# Patient Record
Sex: FEMALE | Race: BLACK OR AFRICAN AMERICAN | NOT HISPANIC OR LATINO | URBAN - METROPOLITAN AREA
[De-identification: names, ages, dates, MRNs, and addresses within clinical notes are randomized per-mention and may not be internally consistent; named-entity substitution may affect disease eponyms.]

---

## 2024-06-05 ENCOUNTER — OFFICE VISIT (OUTPATIENT)
Dept: FAMILY MEDICINE | Facility: CLINIC | Age: 24
End: 2024-06-05
Payer: MEDICAID

## 2024-06-05 VITALS
TEMPERATURE: 98 F | HEIGHT: 63 IN | OXYGEN SATURATION: 100 % | WEIGHT: 199.13 LBS | HEART RATE: 83 BPM | SYSTOLIC BLOOD PRESSURE: 112 MMHG | DIASTOLIC BLOOD PRESSURE: 76 MMHG | BODY MASS INDEX: 35.28 KG/M2

## 2024-06-05 DIAGNOSIS — Z23 NEED FOR VACCINATION: ICD-10-CM

## 2024-06-05 DIAGNOSIS — Z32.01 POSITIVE URINE PREGNANCY TEST: Primary | ICD-10-CM

## 2024-06-05 DIAGNOSIS — O09.33 LATE PRENATAL CARE COMPLICATING PREGNANCY, THIRD TRIMESTER: ICD-10-CM

## 2024-06-05 LAB
B-HCG UR QL: POSITIVE
BACTERIA #/AREA URNS AUTO: ABNORMAL /HPF
BASOPHILS # BLD AUTO: 0.01 X10(3)/MCL
BASOPHILS NFR BLD AUTO: 0.2 %
BILIRUB SERPL-MCNC: NEGATIVE MG/DL
BILIRUB UR QL STRIP.AUTO: NEGATIVE
BLOOD URINE, POC: NEGATIVE
C TRACH DNA SPEC QL NAA+PROBE: NOT DETECTED
CLARITY UR: CLEAR
CLARITY, POC UA: NORMAL
COLOR UR AUTO: COLORLESS
COLOR, POC UA: YELLOW
CTP QC/QA: YES
EOSINOPHIL # BLD AUTO: 0.04 X10(3)/MCL (ref 0–0.9)
EOSINOPHIL NFR BLD AUTO: 0.9 %
ERYTHROCYTE [DISTWIDTH] IN BLOOD BY AUTOMATED COUNT: 14.5 % (ref 11.5–17)
GLUCOSE 1H P 100 G GLC PO SERPL-MCNC: 164 MG/DL (ref 100–180)
GLUCOSE UR QL STRIP: NEGATIVE
GLUCOSE UR QL STRIP: NORMAL
GROUP & RH: NORMAL
HBV SURFACE AG SERPL QL IA: NONREACTIVE
HCT VFR BLD AUTO: 31.2 % (ref 37–47)
HCV AB SERPL QL IA: NONREACTIVE
HGB BLD-MCNC: 10.4 G/DL (ref 12–16)
HGB UR QL STRIP: NEGATIVE
HIV 1+2 AB+HIV1 P24 AG SERPL QL IA: NONREACTIVE
HYALINE CASTS #/AREA URNS LPF: ABNORMAL /LPF
IMM GRANULOCYTES # BLD AUTO: 0.02 X10(3)/MCL (ref 0–0.04)
IMM GRANULOCYTES NFR BLD AUTO: 0.4 %
INDIRECT COOMBS: NORMAL
KETONES UR QL STRIP: NEGATIVE
KETONES UR QL STRIP: NEGATIVE
LEUKOCYTE ESTERASE UR QL STRIP: NEGATIVE
LEUKOCYTE ESTERASE URINE, POC: NEGATIVE
LYMPHOCYTES # BLD AUTO: 1.02 X10(3)/MCL (ref 0.6–4.6)
LYMPHOCYTES NFR BLD AUTO: 21.9 %
MCH RBC QN AUTO: 28.6 PG (ref 27–31)
MCHC RBC AUTO-ENTMCNC: 33.3 G/DL (ref 33–36)
MCV RBC AUTO: 85.7 FL (ref 80–94)
MONOCYTES # BLD AUTO: 0.19 X10(3)/MCL (ref 0.1–1.3)
MONOCYTES NFR BLD AUTO: 4.1 %
MUCOUS THREADS URNS QL MICRO: ABNORMAL /LPF
N GONORRHOEA DNA SPEC QL NAA+PROBE: NOT DETECTED
NEUTROPHILS # BLD AUTO: 3.37 X10(3)/MCL (ref 2.1–9.2)
NEUTROPHILS NFR BLD AUTO: 72.5 %
NITRITE UR QL STRIP: NEGATIVE
NITRITE, POC UA: NEGATIVE
NRBC BLD AUTO-RTO: 0 %
PH UR STRIP: 7 [PH]
PH, POC UA: 7
PLATELET # BLD AUTO: 293 X10(3)/MCL (ref 130–400)
PMV BLD AUTO: 10.2 FL (ref 7.4–10.4)
PROT UR QL STRIP: NEGATIVE
PROTEIN, POC: NEGATIVE
RBC # BLD AUTO: 3.64 X10(6)/MCL (ref 4.2–5.4)
RBC #/AREA URNS AUTO: ABNORMAL /HPF
SOURCE (OHS): NORMAL
SP GR UR STRIP.AUTO: 1.01 (ref 1–1.03)
SPECIFIC GRAVITY, POC UA: 1.01
SPECIMEN OUTDATE: NORMAL
SQUAMOUS #/AREA URNS LPF: ABNORMAL /HPF
T PALLIDUM AB SER QL: NONREACTIVE
UROBILINOGEN UR STRIP-ACNC: NORMAL
UROBILINOGEN, POC UA: 0.2
WBC # SPEC AUTO: 4.65 X10(3)/MCL (ref 4.5–11.5)
WBC #/AREA URNS AUTO: ABNORMAL /HPF

## 2024-06-05 PROCEDURE — 86780 TREPONEMA PALLIDUM: CPT

## 2024-06-05 PROCEDURE — 81001 URINALYSIS AUTO W/SCOPE: CPT

## 2024-06-05 PROCEDURE — 81025 URINE PREGNANCY TEST: CPT | Mod: PBBFAC

## 2024-06-05 PROCEDURE — 88174 CYTOPATH C/V AUTO IN FLUID: CPT

## 2024-06-05 PROCEDURE — 36415 COLL VENOUS BLD VENIPUNCTURE: CPT

## 2024-06-05 PROCEDURE — 87491 CHLMYD TRACH DNA AMP PROBE: CPT

## 2024-06-05 PROCEDURE — 87086 URINE CULTURE/COLONY COUNT: CPT

## 2024-06-05 PROCEDURE — 90715 TDAP VACCINE 7 YRS/> IM: CPT | Mod: PBBFAC

## 2024-06-05 PROCEDURE — 87591 N.GONORRHOEAE DNA AMP PROB: CPT

## 2024-06-05 PROCEDURE — 86787 VARICELLA-ZOSTER ANTIBODY: CPT

## 2024-06-05 PROCEDURE — 87340 HEPATITIS B SURFACE AG IA: CPT

## 2024-06-05 PROCEDURE — 82950 GLUCOSE TEST: CPT

## 2024-06-05 PROCEDURE — 85660 RBC SICKLE CELL TEST: CPT

## 2024-06-05 PROCEDURE — 99203 OFFICE O/P NEW LOW 30 MIN: CPT | Mod: PBBFAC,25

## 2024-06-05 PROCEDURE — 87624 HPV HI-RISK TYP POOLED RSLT: CPT

## 2024-06-05 PROCEDURE — 86762 RUBELLA ANTIBODY: CPT

## 2024-06-05 PROCEDURE — 86850 RBC ANTIBODY SCREEN: CPT

## 2024-06-05 PROCEDURE — 87389 HIV-1 AG W/HIV-1&-2 AB AG IA: CPT

## 2024-06-05 PROCEDURE — 81002 URINALYSIS NONAUTO W/O SCOPE: CPT | Mod: 59,PBBFAC

## 2024-06-05 PROCEDURE — 85025 COMPLETE CBC W/AUTO DIFF WBC: CPT

## 2024-06-05 PROCEDURE — 90471 IMMUNIZATION ADMIN: CPT | Mod: PBBFAC

## 2024-06-05 PROCEDURE — 86803 HEPATITIS C AB TEST: CPT

## 2024-06-05 RX ADMIN — TETANUS TOXOID, REDUCED DIPHTHERIA TOXOID AND ACELLULAR PERTUSSIS VACCINE, ADSORBED 0.5 ML: 5; 2.5; 8; 8; 2.5 SUSPENSION INTRAMUSCULAR at 11:06

## 2024-06-05 NOTE — PROGRESS NOTES
"[facility] OB OFFICE VISIT NOTE  Bhavesh Carreon  [id number]  2024    Chief Complaint: UPT      Bhavesh Carreon is a 23 y.o. female   presenting to [facility] for positive UPT.     Current Issues: no unusual complaints    Chronic Issues: none    Gestational History:  G3 current  G2 term, RCS  G1 term, CS because nuchal cord      Gyn History:   - LMP: 23  - Age at menarche: 10 years  - Menstrual hx: regular, 28 day cycles, 3 pads/day, 5 days per period  - History of birth control: oral contraceptive only  - History of STDs and/or Abnormal PAPs: none, last Pap , states normal.   - History of prior : x2    Past Medical History: no  Surgical History: CSx2  Family History: denies  Social History: denies currently. Admits to tobacco smoking before current pregnancy  Medications: PNVs  PCP: no    Review of Systems   Constitutional:  Negative for chills and fever.   Respiratory:  Negative for shortness of breath.    Cardiovascular:  Negative for chest pain and palpitations.   Gastrointestinal:  Negative for diarrhea and vomiting.   Genitourinary:  Negative for dysuria.       Antepartum specific   - Fetal movements: yes  - Vaginal bleeding: no  - Vaginal discharge: no  - Loss of fluid: no  - Contractions: no  - Headaches: no  - Vision changes: no  - Edema: no    Blood pressure 112/76, pulse 83, temperature 97.9 °F (36.6 °C), temperature source Oral, height 5' 3" (1.6 m), weight 90.3 kg (199 lb 1.6 oz), last menstrual period 2023, SpO2 100%.   Physical Exam  General: well developed, gravid female, appears happy and is cooperative  Pysch: alert and oriented X 3  Resp: Lungs CTA bilaterally, no wheezing, no rhonchi, non labored respirations  CV: +2 symmetrical pulses upper extremity, mild edema of lower extremity, no murmurs, rubs or gallops  ABD: gravid, non TTP, +BS  FHT: 140s by Doppler  Fundal Height: 30 cm  Pelvic: Speculum exam performed, Cervix was visualized. Closed thicke and posterior, no " discharge present. No active bleeding or petechia appreciated. Pap and GC/CT swab performed.     Current Medications:   Current Outpatient Medications   Medication Sig Dispense Refill    PNV 11-iron fum-folic acid-om3 28 mg iron-1 mg -200 mg Cap Take 1 capsule by mouth once daily.       No current facility-administered medications for this visit.       Labs:  Urine dipstick:   Lab Results   Component Value Date    COLORU Yellow 2024    SPECGRAV 1.015 2024    PHUR 7.0 2024    WBCUR NEGATIVE 2024    NITRITE NEGATIVE 2024    PROTEINPOC NEGATIVE 2024    GLUCOSEUR NEGATIVE 2024    KETONESU NEGATIVE 2024    UROBILINOGEN 0.2 2024    BILIRUBINPOC NEGATIVE 2024    RBCUR NEGATIVE 2024         OB Labs collect today 24  - Blood Type and Rh:   - Antibody Screen:   - CBC H/H:   - HIV:   - RPR:   - GC:   - CT:   - HBsAg:   - HCVAb:   - Rubella:   - Varicella:   - UA & Culture:   - Sickle Cell Screen:   - PAP:   - Influenza vaccine date: n/a  - 1H GTT:   - Date of Tdap: 24    - Quad Screen: out of window. Declined CFDNA    - BTL consent: signed 24, scanned into chart    37 Week Lab  - CBC H/H:   - RPR:   - GBS Culture:   - HIV:   - Cervical GC:     Imaging:   Initial US:  Anatomy Scan:    Assessment:   1. Positive urine pregnancy test    2. Late prenatal care complicating pregnancy, third trimester    3. Need for vaccination      Based on LMP, patient is 29w1d, MILA of 2024.     Plan:  - Prior : Yes  - OB Protocol   - PNVs  - Urine dip reviewed as above  - Indicated labs: PENDING  - 1 h GTT today  - Discussed genetic screening, declined CFDNA  - Tdap today  - Mother plans to breast/bottle feed  - Postpartum contraception discussion: BTL, signed today  - Labor precautions discussed in depth  -  Continuity patient after delivery: No    Orders Placed This Encounter   Procedures    Chlamydia/GC, PCR    Urine Culture High Risk    CBC Auto  Differential    HIV 1/2 Ag/Ab (4th Gen)    SYPHILIS ANTIBODY (WITH REFLEX RPR)    Hepatitis B Surface Antigen    Hepatitis C Antibody    Rubella Antibody, IgG    Sickle Cell Screen    Urinalysis    Varicella Zoster Antibody, IgG    CBC with Differential    Glucose Tolerance 1 Hour    Ambulatory referral/consult to Family Practice    POCT urine pregnancy    POCT URINE DIPSTICK WITHOUT MICROSCOPE    Type & Screen     RTC PRN.   Will set have scheduled with Initial OB Visit. Referral to FM OB placed.     Rina Tyson  [facility] HO-2

## 2024-06-06 LAB
HGB S BLD QL SOLY: NEGATIVE
RUBV IGG SERPL IA-ACNC: 1.2
RUBV IGG SERPL QL IA: POSITIVE
VZV IGG SER IA-ACNC: 0.4
VZV IGG SER QL IA: NEGATIVE

## 2024-06-06 NOTE — PROGRESS NOTES
Patient was discussed with the resident on the DOS.   Services were provided at a teaching facility.   I was immediately available during the encounter.   I have reviewed the resident's note.   The assessment, plan and management are reasonable and appropriate.      Cori Nix MD  Family Medicine  [facility] / [facility]

## 2024-06-07 LAB
BACTERIA UR CULT: NO GROWTH
HIGH RISK HPV: NEGATIVE
PSYCHE PATHOLOGY RESULT: NORMAL

## 2024-06-12 ENCOUNTER — TELEPHONE (OUTPATIENT)
Dept: FAMILY MEDICINE | Facility: CLINIC | Age: 24
End: 2024-06-12
Payer: MEDICAID

## 2024-06-12 DIAGNOSIS — R73.09 ABNORMAL GTT (GLUCOSE TOLERANCE TEST): Primary | ICD-10-CM

## 2024-06-12 NOTE — TELEPHONE ENCOUNTER
Spoke with patient about abnormal 1 h GTT. Needs 3 h GTT. Already ate today. Patient agrees to do 3h GTT tomorrow, fasting, on initial OB visit tomorrow.     1. Abnormal GTT (glucose tolerance test)  - Glucose Tolerance, 3 Hours; Future

## 2024-06-13 ENCOUNTER — OFFICE VISIT (OUTPATIENT)
Dept: FAMILY MEDICINE | Facility: CLINIC | Age: 24
End: 2024-06-13
Payer: MEDICAID

## 2024-06-13 ENCOUNTER — HOSPITAL ENCOUNTER (OUTPATIENT)
Dept: RADIOLOGY | Facility: HOSPITAL | Age: 24
Discharge: HOME | End: 2024-06-13
Attending: OBSTETRICS & GYNECOLOGY
Payer: MEDICAID

## 2024-06-13 VITALS
BODY MASS INDEX: 35.65 KG/M2 | WEIGHT: 201.19 LBS | SYSTOLIC BLOOD PRESSURE: 110 MMHG | OXYGEN SATURATION: 100 % | DIASTOLIC BLOOD PRESSURE: 68 MMHG | HEIGHT: 63 IN | TEMPERATURE: 97 F | RESPIRATION RATE: 16 BRPM | HEART RATE: 82 BPM

## 2024-06-13 DIAGNOSIS — Z32.01 POSITIVE URINE PREGNANCY TEST: ICD-10-CM

## 2024-06-13 DIAGNOSIS — O09.33 LATE PRENATAL CARE COMPLICATING PREGNANCY, THIRD TRIMESTER: ICD-10-CM

## 2024-06-13 DIAGNOSIS — Z3A.30 30 WEEKS GESTATION OF PREGNANCY: Primary | ICD-10-CM

## 2024-06-13 DIAGNOSIS — R73.09 ABNORMAL GTT (GLUCOSE TOLERANCE TEST): ICD-10-CM

## 2024-06-13 DIAGNOSIS — Z34.90 PREGNANCY: ICD-10-CM

## 2024-06-13 LAB
BILIRUB SERPL-MCNC: NORMAL MG/DL
BLOOD URINE, POC: NORMAL
CLARITY, POC UA: CLEAR
COLOR, POC UA: NORMAL
GLUCOSE 1H P 100 G GLC PO SERPL-MCNC: 141 MG/DL (ref 100–180)
GLUCOSE 2H P 100 G GLC PO SERPL-MCNC: 101 MG/DL (ref 70–140)
GLUCOSE 3H P 100 G GLC PO SERPL-MCNC: 57 MG/DL (ref 70–115)
GLUCOSE P FAST SERPL-MCNC: 92 MG/DL (ref 70–100)
GLUCOSE UR QL STRIP: NORMAL
KETONES UR QL STRIP: NORMAL
LEUKOCYTE ESTERASE URINE, POC: NORMAL
NITRITE, POC UA: NORMAL
PH, POC UA: 7
PROTEIN, POC: NORMAL
SPECIFIC GRAVITY, POC UA: 1.02
UROBILINOGEN, POC UA: 0.2

## 2024-06-13 PROCEDURE — 82951 GLUCOSE TOLERANCE TEST (GTT): CPT

## 2024-06-13 PROCEDURE — 99214 OFFICE O/P EST MOD 30 MIN: CPT | Mod: PBBFAC,25

## 2024-06-13 PROCEDURE — 82947 ASSAY GLUCOSE BLOOD QUANT: CPT

## 2024-06-13 PROCEDURE — 82950 GLUCOSE TEST: CPT

## 2024-06-13 PROCEDURE — 36415 COLL VENOUS BLD VENIPUNCTURE: CPT

## 2024-06-13 PROCEDURE — 81002 URINALYSIS NONAUTO W/O SCOPE: CPT | Mod: PBBFAC

## 2024-06-13 PROCEDURE — 76805 OB US >/= 14 WKS SNGL FETUS: CPT | Mod: TC

## 2024-06-13 PROCEDURE — 82952 GTT-ADDED SAMPLES: CPT

## 2024-06-13 NOTE — PROGRESS NOTES
[facility] OB OFFICE VISIT NOTE  Bhavesh Carreon  [id number]  2024    Chief Complaint: Initial Prenatal Visit (IOB 30w2d, based on LMP. No complaints.)      Bhavesh Carreon is a 23 y.o. female   30w2d MILA 2024, by Last Menstrual Period presenting for INOB clinic    Current Issues: None    Chronic Issues: None     Gestational History:  (date, GA, length labor, BW, sex, type, anesthesia, place, complications)  G3 current  G2 term, RCS  G1 term, CS because nuchal cord    Gyn History:   - LMP: 23  - Age at menarche: 10 years  - Menstrual hx: regular, 28 day cycles, 3 pads/day, 5 days per period  - History of birth control: oral contraceptive only  - History of STDs and/or Abnormal PAPs: none, last Pap , states normal.   - History of prior : x2    Past Medical History: no  Surgical History: CSx2  Family History: denies  Social History: denies currently. Admits to tobacco smoking before current pregnancy  Medications: PNVs  PCP: no      Review of Systems  Antepartum specific ROS  - Fetal movements: Yes  - Vaginal bleeding: No  - Vaginal discharge: No  - Loss of fluid: No  - Contractions: No  - Headaches: No  - Vision changes: No  - Edema: No    Last menstrual period 2023.   Physical Exam  Physical Exam  General: in no acute distress  RESP: clear to auscultation bilaterally, non labored  CV: regular rate and rhythm, no murmurs, no edema  ABD: gravid, nontender, BS+  FHT: 141 by US  Fundal Height: 31 cm    Current Medications:   Current Outpatient Medications   Medication Sig Dispense Refill    PNV 11-iron fum-folic acid-om3 28 mg iron-1 mg -200 mg Cap Take 1 capsule by mouth once daily.       No current facility-administered medications for this visit.       Labs:  Urine dipstick:   Glucose, UA neg   Bilirubin, POC neg   Ketones, UA neg   Spec Grav UA 1.020   Blood, UA neg   pH, UA 7.0   Protein, POC neg   Urobilinogen, UA 0.2   Nitrite, UA neg   WBC, UA neg   Color, UA Light Yellow    Clarity, UA Clear       Initial OB Labs Collected 2024  - Blood Type and Rh: A+  - Antibody Screen: Neg  - CBC H/H: 10.4/31.2  - HIV: NR  - RPR: NR  - GC: Neg  - CT: Neg  - HBsAg: NR  - HCVAb: NR  - Rubella: Positive  - Varicella: Neg  - UA & Culture: No growth  - Sickle Cell Screen: Neg  - PAP: NIL  - Influenza vaccine date: N/A  - BTL desired: Forms signed 2024    15-20 Weeks Lab  - Quad Screen: Not collected, late to prenatal care    28 Week Lab Completed 2024  - 1H GTT: 164- failed  - 3H GTT (2024): Passed  - Rhogam: N/A  - Date of Tdap: 2024  - CBC H/H: N/A  - RPR: N/A  - BTL consent: Signed 2024    37 Week Lab  - CBC H/H:   - RPR:   - GBS Culture:   - HIV:   - Cervical GC:     Imaging:   Initial US 2024:  Impression:   Single intrauterine pregnancy with a fetal heart rate of 141 beats per minute. Estimated age by ultrasound 30 weeks and 1 day 2 weeks and 1 day. Estimated date of delivery by ultrasound 2024    Anatomy Scan:    Assessment:   1. 30 weeks gestation of pregnancy    2. Positive urine pregnancy test    3. Late prenatal care complicating pregnancy, third trimester    4. Abnormal GTT (glucose tolerance test)        Plan:  - Prior : Yes  - Continue to attend all scheduled OB visits  - ED precautions for any RUQ pain, leakage of fluids, or severe headache  - Should the patient need to visit the ED go to the OB ED at [facility]  - PNVs  - Urine dip reviewed as above  - Indicated labs: PENDING  - Mother plans to breast/bottle feed  - Postpartum contraception discussion: BTL, signed today   - Labor precautions discussed in depth  - FM Continuity patient after delivery: No    Orders Placed This Encounter   Procedures    GTT Fasting    GTT 1 Hour    POCT URINE DIPSTICK WITHOUT MICROSCOPE       Return to continuity clinic in 3 weeks    Dennis Mckeon MD  [facility] HO-I

## 2024-06-13 NOTE — PATIENT INSTRUCTIONS
Well Child Exam    About this topic  A well child exam is a visit with your child's doctor to check your child's health. The doctor will check your child's growth, progress, and shot record. It is also a time for you to ask your child's doctor any questions you have about your child's health. Your child will have a full exam during the office visit. Other things that are sometimes checked are hearing, eyesight, and urine or blood tests. The doctor may give shots during your child's well visit.    General    Getting Ready for a Well Child Exam    A well child exam is a good time for you to talk with your child's doctor about any of these topics:    Eating habits or diet    How your child acts    Sleep issues    Growth    Safety    Vaccines    Toilet training    Teen years    How your child is doing in school or any learning concerns    Home life    You may want to make a written list of the things you want to talk about with your child's doctor. Be sure to bring your list of questions to your child's well visit. You may also want to do some research on your own before your office visit by reading books or looking at Web sites. Other family members, child caregivers, and grandparents may be able to help you too. Your child's doctor may ask also you about your family's health history or if your child is around anyone who smokes.    The Exam    The doctor measures your child's weight, height, and sometimes head size or body mass index (BMI). The doctor plots these numbers on a growth curve. The growth curve gives a picture of your baby's growth at each visit. The doctor may check your child's temperature, blood pressure, breathing, and heart rate. The doctor may listen to your child's heart, lungs, and belly. Your doctor will do a full exam of your child from the head to the toes.    Growth and Development Questions    Your doctor will ask you about your child's progress. The doctor will focus on the skills that  are likely to happen at your child's age. Some of these are motor skills like rolling over, walking, and running, while others are social skills, or how your child interacts with other people. Your child's doctor will also ask you how your child is doing in school.    Help for Parents    Your doctor will talk with you about any concerns you have about your child during this visit. The doctor may also talk with you about:    Getting family help or other support    Ways to help your child's brain growth    How your child plays and acts with others    Ways to help your child exercise    Safety    Eating habits    Vaccines    Quitting smoking    Help if you have a low mood after having a baby    Shots or Vaccines    It is important for your child to get shots on time. This protects from very serious illnesses like pertussis, measles, or some kinds of pneumonia. Sometimes, your child may need more than one dose of vaccine. The vaccines used today are safer than ever. Talk to your doctor if you have any questions or concerns about giving your child vaccines.    Well Child Exam Schedule    The American Academy of Pediatrics (AAP) suggests this plan for well child visits:     (3 to 5 days old)    1 month old    2 months old    4 months old    6 months old    9 months old    12 months old    15 months old    18 months old    2 years old    30 months old    3 years old    4 years old    Once each year until age 21    Well child exams are very important. Since your child is healthy at this visit and it is scheduled ahead of time, you can think about things you want to ask your child's doctor. Be sure to follow the above plan for well child visits as well as any other visits your child's doctor suggests.    Where can I learn more?    Centers for Disease Control and Prevention    http://www.cdc.gov/vaccines     Healthy  Children    https://www.healthychildren.org/English/family-life/health-management/Pages/Well-Child-Care-A-Check-Up-for-Success.aspx    Disclaimer.  This generalized information is a limited summary of diagnosis, treatment, and/or medication information. It is not meant to be comprehensive and should be used as a tool to help the user understand and/or assess potential diagnostic and treatment options. It does NOT include all information about conditions, treatments, medications, side effects, or risks that may apply to a specific patient. It is not intended to be medical advice or a substitute for the medical advice, diagnosis, or treatment of a health care provider based on the health care provider's examination and assessment of a patients specific and unique circumstances. Patients must speak with a health care provider for complete information about their health, medical questions, and treatment options, including any risks or benefits regarding use of medications. This information does not endorse any treatments or medications as safe, effective, or approved for treating a specific patient. UpToDate, Inc. and its affiliates disclaim any warranty or liability relating to this information or the use thereof. The use of this information is governed by the Terms of Use, available at Terms of Use. ©2022 UpToDate, Inc. and its affiliates and/or licensors. All rights reserved.

## 2024-06-14 DIAGNOSIS — Z3A.30 30 WEEKS GESTATION OF PREGNANCY: Primary | ICD-10-CM

## 2024-06-19 NOTE — PROGRESS NOTES
Vitals  Reviewed    I have personally reviewed the review of systems (ROS) and past, family and social histories (PFSH) documented above by the resident.  I have reviewed the care furnished by the resident during the encounter, including a review of the patient's medical history, the resident's findings on physical examination, diagnosis, and the treatment plan.  I participated in the management of the patient and was immediately available throughout the encounter.   I was physically present during all key portions of the service(s) provided with the resident.  Services were furnished in a primary care center located in the outpatient department of a [facility].

## 2024-06-24 ENCOUNTER — OFFICE VISIT (OUTPATIENT)
Dept: MATERNAL FETAL MEDICINE | Facility: CLINIC | Age: 24
End: 2024-06-24
Payer: MEDICAID

## 2024-06-24 ENCOUNTER — PROCEDURE VISIT (OUTPATIENT)
Dept: MATERNAL FETAL MEDICINE | Facility: CLINIC | Age: 24
End: 2024-06-24
Payer: MEDICAID

## 2024-06-24 VITALS
HEART RATE: 93 BPM | DIASTOLIC BLOOD PRESSURE: 78 MMHG | WEIGHT: 202.38 LBS | SYSTOLIC BLOOD PRESSURE: 114 MMHG | BODY MASS INDEX: 35.86 KG/M2 | HEIGHT: 63 IN

## 2024-06-24 DIAGNOSIS — Z87.59 HISTORY OF PRIOR PREGNANCY WITH IUGR NEWBORN: Primary | ICD-10-CM

## 2024-06-24 DIAGNOSIS — Z3A.30 30 WEEKS GESTATION OF PREGNANCY: ICD-10-CM

## 2024-06-24 DIAGNOSIS — O09.33 LATE PRENATAL CARE AFFECTING PREGNANCY IN THIRD TRIMESTER: ICD-10-CM

## 2024-06-24 DIAGNOSIS — O99.213 OTHER OBESITY AFFECTING PREGNANCY IN THIRD TRIMESTER: ICD-10-CM

## 2024-06-24 DIAGNOSIS — O35.BXX1 ECHOGENIC FOCUS OF HEART OF FETUS AFFECTING ANTEPARTUM CARE OF MOTHER, FETUS 1 OF MULTIPLE GESTATION: ICD-10-CM

## 2024-06-24 DIAGNOSIS — E66.8 OTHER OBESITY AFFECTING PREGNANCY IN THIRD TRIMESTER: ICD-10-CM

## 2024-06-24 PROBLEM — O35.BXX0 ECHOGENIC FOCUS OF HEART OF FETUS AFFECTING ANTEPARTUM CARE OF MOTHER: Status: ACTIVE | Noted: 2024-06-24

## 2024-06-24 NOTE — ASSESSMENT & PLAN NOTE
She had late entry into care due to hiding the pregnancy from her family. She recalls her LMP and EDC according to this date is 8/20/24. First ultrasound obtained a week and a half ago with [facility] 8/21/24.   We have discussed the importance of early and regular prenatal care. We have encouraged her to seek prenatal care early in subsequent pregnancy as well as compliance for the remainder of her current pregnancy.

## 2024-06-24 NOTE — PROGRESS NOTES
MATERNAL-FETAL MEDICINE   CONSULT NOTE    Provider requesting consultation: [facility]    SUBJECTIVE:     Ms. Bhavesh Carreon is a 23 y.o.  female with IUP at 31w6d who is seen in consultation by MFM for evaluation and management of:  Problem   History of Prior Pregnancy With Iugr [place]   BMI affecting pregnancy in third trimester   Late Prenatal Care Affecting Pregnancy in Third Trimester     Bhavesh presents for consultation due to a history of FGR (5#10 @38w). She denies any pregnancy related complications during that pregnancy.  She had late entry into care. She recalls her LMP, however, did not seek care due to hiding the pregnancy from her family. She says they help her with childcare a lot and she was afraid to tell them there was another baby coming. She is planning BTL this time.  BMI 36. No early 1h as she had late PNC. Routine 1h-failed. 3h OGTT passed.  Declines NIPT politely.       Medication List with Changes/Refills   Current Medications    PNV 11-IRON FUM-FOLIC ACID-OM3 28 MG IRON-1 MG -200 MG CAP    Take 1 capsule by mouth once daily.       Review of patient's allergies indicates:  No Known Allergies    PMH:  Past Medical History:   Diagnosis Date    Asthma     childhood       PObHx:  OB History    Para Term  AB Living   3 2 2 0 0 2   SAB IAB Ectopic Multiple Live Births   0 0 0 0 2      # Outcome Date GA Lbr René/2nd Weight Sex Type Anes PTL Lv   3 Current            2 Term 21 38w0d  2.551 kg (5 lb 10 oz) F CS-LTranv Spinal N ABENA   1 Term 20 40w0d  3.345 kg (7 lb 6 oz) F CS-LTranv EPI N ABENA      Complications: Nuchal cord affecting delivery       PSH:  Past Surgical History:   Procedure Laterality Date     SECTION         Family history:family history includes No Known Problems in her father and mother.    Social history: reports that she quit smoking about 17 months ago. Her smoking use included cigarettes. She started smoking about 7 months ago. She has been  "exposed to tobacco smoke. She has never used smokeless tobacco. She reports that she does not drink alcohol and does not use drugs.    Genetic history:  The patient denies any inherited genetic diseases or birth defects in herself or her partner's personal history or family.    Objective:   /78 (BP Location: Right arm)   Pulse 93   Ht 5' 3" (1.6 m)   Wt 91.8 kg (202 lb 6.1 oz)   LMP 2023 (Exact Date)   Breastfeeding Unknown   BMI 35.85 kg/m²     Ultrasound performed. See viewpoint for full ultrasound report.    A detailed fetal anatomic ultrasound examination was performed for the following high risk indication: BMI, Echogenic focus.   The fetus is cephalic.  No fetal structural malformations are identified; however, fetal imaging is incomplete today (AA, vermis, spine). An echogenic focus is noted in the left ventricle.  A follow-up study will be scheduled to complete the fetal anatomic survey.   Estimated fetal weight measures at the 18th percentile with abdominal circumference measuring at the 23rd percentile.  Amniotic fluid volume is normal.  Placental location is anterior without evidence of previa.     ASSESSMENT/PLAN:     23 y.o.  female with IUP at 31w6d     History of prior pregnancy with IUGR   I reviewed the patient's obstetric history which is remarkable for a previous pregnancy complicated by FGR. No other conditions noted during that pregnancy. I counseled the patient regarding the recurrence risk for FGR (however the exact number is not clear, likely ~15-20% recurrence.). We discussed recommendations for management during this pregnancy including evaluation of fetal growth in the third trimester. Low molecular weight heparin and aspirin have not been beneficial in preventing recurrent FGR. If the patient had preeclampsia or gestational hypertension associated with FGR, low dose aspirin therapy should be initiated at 12-16 weeks in subsequent pregnancies. Patient was " counseled on modifiable risk factors including tobacco cessation, abstinence from alcohol and drug use, and maternal diet (ensuring appropriate gestational weight gain).     Recommendations:  -Fetal growth ultrasounds in third trimester, every 4 weeks  -Antepartum surveillance is not indicated in absence of FGR diagnosis or other maternal-fetal indications for prenatal testing  -Monitor gestational weight gain, encourage prenatal vitamin      BMI affecting pregnancy in third trimester  There are  risks associated with obesity which include and increased risk of hypertension, preeclampsia, gestational diabetes, venous thromboembolic disease,  delivery, macrosomia (with resultant shoulder dystocia, obstetric sphincter injury), IUFD, longer first stage of labor, decreased TOLAC success rate, emergent & scheduled  & complications of  (prolonged OR time, delayed delivery, excessive EBL, infection, wound separation/infection, higher spinal failure rate, more difficult intubation).  Obesity is also associated with fetal anomalies, specifically neural tube defects.  Studies have shown that the rate of complication increases with rising BMI and thus pregnancies with maternal morbid obesity are at increased risk.      BMI 36    Recommendations:  TWG goal is 11-20 lbs  Screen for signs/symptoms of obstructive sleep apnea  Nutritionist consult offered (this is to be ordered by primary OB provider)  Consider early 1 hr GCT (late prenatal care); routine at 24-28 weeks gestation - passed 3h OGTT   too late in gestation to begin nowTargeted anatomical survey scheduled at 18-20 weeks (started today, some views limited)  Fetal growth ultrasound at 32 weeks if BMI >= 40  Lovenox 40 mg BID for VTE prophylaxis while admitted to the hospital (antepartum or postpartum) if BMI >= 40.   Encouraged breastfeeding  Postpartum lifestyle modifications & weight loss      Late prenatal care affecting pregnancy in  third trimester  She had late entry into care due to hiding the pregnancy from her family. She recalls her LMP and EDC according to this date is 8/20/24. First ultrasound obtained a week and a half ago with [facility] 8/21/24.   We have discussed the importance of early and regular prenatal care. We have encouraged her to seek prenatal care early in subsequent pregnancy as well as compliance for the remainder of her current pregnancy.    Echogenic focus of heart of fetus affecting antepartum care of mother  We discussed the significance of an echogenic focus (EIF) in the ventricle of the fetal heart. This is not a true structural abnormality and is not associated with congenital heart disease or abnormal cardiac function. It is seen as a normal variant in about 5 - 7% of pregnancies. Down syndrome fetuses have a higher incidence of echogenic foci than normal fetuses, therefore it is considered to be a potential marker for fetal Down syndrome. In a woman with other risk factors for Down syndrome (advanced maternal age, elevated quad screen risk or presence of other markers), the presence of an echogenic focus may increase the relative risk of Down syndrome slightly. In a younger woman (< age 35) or in a woman without other risk factors or markers for Down syndrome, the significance of an isolated echogenic focus is minimal. The overwhelming majority (99%) of these fetuses will not have Down syndrome. The presence of an EIF is an indication for a targeted anatomic survey, which was done today. No fetal major structural malformations or other minor markers associated with Down Syndrome were identified. Serum screening, either with a quad screen or cfDNA testing, can be used to provide a quantitative estimate of the chance for fetal Down Syndrome. She has not completed any biochemical screening tests. We have discussed her options and she has politely declined both screening and diagnostic testing.               FOLLOW UP:    F/u in 4 weeks for US/MFM visit      This consultation was completed with the assistance of Mihaela Rodriguez NP.      Sunni Khan MD  Maternal Fetal Medicine

## 2024-06-24 NOTE — ASSESSMENT & PLAN NOTE
We discussed the significance of an echogenic focus (EIF) in the ventricle of the fetal heart. This is not a true structural abnormality and is not associated with congenital heart disease or abnormal cardiac function. It is seen as a normal variant in about 5 - 7% of pregnancies. Down syndrome fetuses have a higher incidence of echogenic foci than normal fetuses, therefore it is considered to be a potential marker for fetal Down syndrome. In a woman with other risk factors for Down syndrome (advanced maternal age, elevated quad screen risk or presence of other markers), the presence of an echogenic focus may increase the relative risk of Down syndrome slightly. In a younger woman (< age 35) or in a woman without other risk factors or markers for Down syndrome, the significance of an isolated echogenic focus is minimal. The overwhelming majority (99%) of these fetuses will not have Down syndrome. The presence of an EIF is an indication for a targeted anatomic survey, which was done today. No fetal major structural malformations or other minor markers associated with Down Syndrome were identified. Serum screening, either with a quad screen or cfDNA testing, can be used to provide a quantitative estimate of the chance for fetal Down Syndrome. She has not completed any biochemical screening tests. We have discussed her options and she has politely declined both screening and diagnostic testing.

## 2024-06-24 NOTE — ASSESSMENT & PLAN NOTE
I reviewed the patient's obstetric history which is remarkable for a previous pregnancy complicated by FGR. No other conditions noted during that pregnancy. I counseled the patient regarding the recurrence risk for FGR (however the exact number is not clear, likely ~15-20% recurrence.). We discussed recommendations for management during this pregnancy including evaluation of fetal growth in the third trimester. Low molecular weight heparin and aspirin have not been beneficial in preventing recurrent FGR. If the patient had preeclampsia or gestational hypertension associated with FGR, low dose aspirin therapy should be initiated at 12-16 weeks in subsequent pregnancies. Patient was counseled on modifiable risk factors including tobacco cessation, abstinence from alcohol and drug use, and maternal diet (ensuring appropriate gestational weight gain).     Recommendations:  -Fetal growth ultrasounds in third trimester, every 4 weeks  -Antepartum surveillance is not indicated in absence of FGR diagnosis or other maternal-fetal indications for prenatal testing  -Monitor gestational weight gain, encourage prenatal vitamin

## 2024-06-24 NOTE — ASSESSMENT & PLAN NOTE
There are  risks associated with obesity which include and increased risk of hypertension, preeclampsia, gestational diabetes, venous thromboembolic disease,  delivery, macrosomia (with resultant shoulder dystocia, obstetric sphincter injury), IUFD, longer first stage of labor, decreased TOLAC success rate, emergent & scheduled  & complications of  (prolonged OR time, delayed delivery, excessive EBL, infection, wound separation/infection, higher spinal failure rate, more difficult intubation).  Obesity is also associated with fetal anomalies, specifically neural tube defects.  Studies have shown that the rate of complication increases with rising BMI and thus pregnancies with maternal morbid obesity are at increased risk.      BMI 36    Recommendations:  TWG goal is 11-20 lbs  Screen for signs/symptoms of obstructive sleep apnea  Nutritionist consult offered (this is to be ordered by primary OB provider)  Consider early 1 hr GCT (late prenatal care); routine at 24-28 weeks gestation - passed 3h OGTT   too late in gestation to begin nowTargeted anatomical survey scheduled at 18-20 weeks (started today, some views limited)  Fetal growth ultrasound at 32 weeks if BMI >= 40  Lovenox 40 mg BID for VTE prophylaxis while admitted to the hospital (antepartum or postpartum) if BMI >= 40.   Encouraged breastfeeding  Postpartum lifestyle modifications & weight loss

## 2024-07-15 NOTE — PROGRESS NOTES
"[facility] OB OFFICE VISIT NOTE  Bhavesh Carreon  [id number]  2024      Chief Complaint: Routine Prenatal Visit (35W0D.)      Bhavesh Carreon is a 23 y.o.  female 35w0d by  LMP consistent with third trimester ultrasound (MILA Estimated Date of Delivery: 24) presenting to [facility] for routine ob follow-up.    Current Issues: none    Chronic Issues: none     Gestational History:  (date, GA, length labor, BW, sex, type, anes, place, complications)  OB History    Para Term  AB Living   3 2 2 0 0 2   SAB IAB Ectopic Multiple Live Births   0 0 0 0 2      # Outcome Date GA Lbr René/2nd Weight Sex Type Anes PTL Lv   3 Current            2 Term 22 38w0d  2.551 kg (5 lb 10 oz) F CS-LTranv Spinal N ABENA   1 Term 20 40w0d  3.345 kg (7 lb 6 oz) F CS-LTranv EPI N ABENA      Complications: Nuchal cord affecting delivery       Gyn History:   - LMP: Patient's last menstrual period was 2023 (exact date).  - Age at menarche: 10 years  - Menstrual hx: regular, 28 day cycles, 3 pads/day, 5 days per period  - History of birth control: oral contraceptive only  - History of STDs and/or Abnormal PAPs: none, last Pap , states normal.   - History of prior : x2    Past Medical History: no  Surgical History: CSx2  Family History: denies  Social History: denies currently. Admits to tobacco smoking before current pregnancy  Medications: PNVs  PCP: no    Review of Systems  Constitutional: no fever, no chills  CV: no chest pain  RESP: no SOB  : no dysuria, no hematuria  GI: no constipation, no diarrhea, no nausea, no vomiting  Psych: no depression, no anxiety    Antepartum specific   - Fetal movements: yes  - Vaginal bleeding: no  - Vaginal discharge: no  - Loss of fluid: no  - Contractions: no  - Headaches: no  - Vision changes: no  - Edema: no    Blood pressure 116/67, pulse 80, temperature 98.3 °F (36.8 °C), temperature source Oral, resp. rate 18, height 5' 3" (1.6 m), weight 92.5 kg (204 lb), " last menstrual period 11/14/2023, SpO2 100%, unknown if currently breastfeeding.     Physical Exam   General: in no acute distress  RESP: clear to auscultation bilaterally, non labored  CV: regular rate and rhythm, no murmurs, no edema  ABD: gravid, nontender, BS+  FHTs: 140s bpm  Fundal height: 34 cm      Current Medications:   Current Outpatient Medications   Medication Sig Dispense Refill    PNV 11-iron fum-folic acid-om3 28 mg iron-1 mg -200 mg Cap Take 1 capsule by mouth once daily.       No current facility-administered medications for this visit.       Labs:  Urine dipstick:   Lab Results   Component Value Date    COLORU Dark Yellow 07/16/2024    SPECGRAV 1.025 07/16/2024    PHUR 7.0 07/16/2024    WBCUR negative 07/16/2024    NITRITE negative 07/16/2024    PROTEINPOC trace 07/16/2024    GLUCOSEUR negative 07/16/2024    KETONESU 40mg/dL 07/16/2024    UROBILINOGEN 2.0 07/16/2024    BILIRUBINPOC small 07/16/2024    RBCUR negative 07/16/2024       Initial OB Labs: Collected 6/5/2024  - Blood Type and Rh: A+  - Antibody Screen: Neg  - CBC H/H: 10.4/31.2  - HIV: NR  - RPR: NR  - GC: Neg  - CT: Neg  - HBsAg: NR  - HCVAb: NR  - Rubella: Positive  - Varicella: Neg  - UA & Culture: No growth  - Sickle Cell Screen: Neg  - PAP: NIL  - Influenza vaccine date: N/A  - BTL desired: Forms signed 6/5/2024 (on media)    15-20 Weeks Lab  - Quad Screen: Not collected, late to prenatal care    28 Week Lab: Completed 6/5/2024  - 1H GTT: 164- failed  - 3H GTT (6/13/2024): Passed  - Rhogam: N/A  - Date of Tdap: 6/5/2024  - CBC H/H: N/A  - RPR: N/A  - BTL consent: Signed 6/5/2024     36 Week Lab  - CBC H/H:   - Syphilis Ab:   - GBS Culture:   - HIV:  - Cervical GC:     Imaging:   Initial US 6/13/2024: Single intrauterine pregnancy with a fetal heart rate of 141 beats per minute. Estimated age by ultrasound 30 weeks and 1 day 2 weeks and 1 day. Estimated date of delivery by ultrasound August 21, 2024  Anatomy Scan 06/24/24:    Impression   =========   A detailed fetal anatomic ultrasound examination was performed for the following high risk indication: BMI, Echogenic focus.   The fetus is cephalic.   No fetal structural malformations are identified; however, fetal imaging is incomplete today (AA, vermis, spine). An echogenic focus is noted in the left ventricle.   A follow-up study will be scheduled to complete the fetal anatomic survey.   Estimated fetal weight measures at the 18th percentile with abdominal circumference measuring at the 23rd percentile.   Amniotic fluid volume is normal.   Placental location is anterior without evidence of previa.     Recommendation   ==============   Suggest repeat scan in 4 weeks for growth.      Assessment and Plan:   1. 35 weeks gestation of pregnancy    - OB Protocol, PNVs   - Urine dip reviewed as above  - Routine (initial) labs: see above  - Mother plans to bottle (formula) feeding  - Postpartum contraception discussion: BTL   - FM Continuity patient after delivery: No  - Labor precautions discussed in depth; if vaginal bleeding or leaking fluid, belly cramping or pain, shortness of breath-chest pain, swelling of the face-hands, ankles and feet or legs. If don't feel the baby move in over an hour. Change in vision. Severe headache that are not resolved with medication    2) Prior history of pregnancy with FGR  Follows [facility] (Dr. Khan)  Last U/S on 24 showed Estimated fetal weight measures at the 18th percentile with abdominal circumference measuring at the 23rd percentile  Has repeat U/S on 24 at 0840 with [facility] for repeat scan to evaluate for fetal growth prior to ; patient aware of this appt and plans to attend    - Return to clinic in 1 week for routine ob and 36 week labs    Braden Seals MD     [facility] Family Medicine Resident HO-1  2024

## 2024-07-16 ENCOUNTER — OFFICE VISIT (OUTPATIENT)
Dept: FAMILY MEDICINE | Facility: CLINIC | Age: 24
End: 2024-07-16
Payer: MEDICAID

## 2024-07-16 VITALS
OXYGEN SATURATION: 100 % | SYSTOLIC BLOOD PRESSURE: 116 MMHG | RESPIRATION RATE: 18 BRPM | HEART RATE: 80 BPM | BODY MASS INDEX: 36.14 KG/M2 | WEIGHT: 204 LBS | HEIGHT: 63 IN | DIASTOLIC BLOOD PRESSURE: 67 MMHG | TEMPERATURE: 98 F

## 2024-07-16 DIAGNOSIS — Z3A.35 35 WEEKS GESTATION OF PREGNANCY: Primary | ICD-10-CM

## 2024-07-16 LAB
BILIRUB SERPL-MCNC: NORMAL MG/DL
BLOOD URINE, POC: NEGATIVE
CLARITY, POC UA: NORMAL
COLOR, POC UA: NORMAL
GLUCOSE UR QL STRIP: NEGATIVE
KETONES UR QL STRIP: NORMAL
LEUKOCYTE ESTERASE URINE, POC: NEGATIVE
NITRITE, POC UA: NEGATIVE
PH, POC UA: 7
PROTEIN, POC: NORMAL
SPECIFIC GRAVITY, POC UA: 1.02
UROBILINOGEN, POC UA: 2

## 2024-07-16 PROCEDURE — 99213 OFFICE O/P EST LOW 20 MIN: CPT | Mod: PBBFAC

## 2024-07-16 PROCEDURE — 81002 URINALYSIS NONAUTO W/O SCOPE: CPT | Mod: PBBFAC

## 2024-07-17 NOTE — PROGRESS NOTES
I reviewed History, PE, A/P and chart was reviewed.  Services provided in the outpatient department of  a teaching facility, I was immediately available.  I agree with resident, care reasonable and necessary with any exceptions stated below.  Management discussed with resident at time of visit.      Cecilia Mo MD  [facility] Family Medicine Residency - CHARITY Serrano  [facility]

## 2024-07-25 ENCOUNTER — OFFICE VISIT (OUTPATIENT)
Dept: FAMILY MEDICINE | Facility: CLINIC | Age: 24
End: 2024-07-25
Payer: MEDICAID

## 2024-07-25 VITALS
SYSTOLIC BLOOD PRESSURE: 135 MMHG | OXYGEN SATURATION: 100 % | WEIGHT: 213 LBS | RESPIRATION RATE: 18 BRPM | DIASTOLIC BLOOD PRESSURE: 83 MMHG | HEIGHT: 63 IN | BODY MASS INDEX: 37.74 KG/M2 | TEMPERATURE: 98 F | HEART RATE: 98 BPM

## 2024-07-25 DIAGNOSIS — Z3A.36 36 WEEKS GESTATION OF PREGNANCY: Primary | ICD-10-CM

## 2024-07-25 LAB
BILIRUB SERPL-MCNC: NEGATIVE MG/DL
BLOOD URINE, POC: NEGATIVE
C TRACH DNA SPEC QL NAA+PROBE: NOT DETECTED
CLARITY, POC UA: CLEAR
COLOR, POC UA: YELLOW
GLUCOSE UR QL STRIP: NEGATIVE
KETONES UR QL STRIP: NORMAL
LEUKOCYTE ESTERASE URINE, POC: NEGATIVE
N GONORRHOEA DNA SPEC QL NAA+PROBE: NOT DETECTED
NITRITE, POC UA: NEGATIVE
PH, POC UA: 7
PRENATAL STREP B CULTURE: NEGATIVE
PROTEIN, POC: NEGATIVE
SOURCE (OHS): NORMAL
SPECIFIC GRAVITY, POC UA: 1.01
UROBILINOGEN, POC UA: 1

## 2024-07-25 PROCEDURE — 99213 OFFICE O/P EST LOW 20 MIN: CPT | Mod: PBBFAC

## 2024-07-25 PROCEDURE — 87591 N.GONORRHOEAE DNA AMP PROB: CPT

## 2024-07-25 PROCEDURE — 87491 CHLMYD TRACH DNA AMP PROBE: CPT

## 2024-07-25 NOTE — PROGRESS NOTES
[facility] OB OFFICE VISIT NOTE  Bhavesh Carreon  [id number]  2024      Chief Complaint: Follow-up and gestation of pregnancy (36 week follow up for pregnancy.)      Bhavesh Carreon is a 23 y.o.  female 36w2d by  LMP consistent with third trimester ultrasound (MILA Estimated Date of Delivery: 24) presenting to [facility] for routine ob follow-up.    Current Issues: none    Chronic Issues: none     Gestational History:  (date, GA, length labor, BW, sex, type, anes, place, complications)  OB History    Para Term  AB Living   3 2 2 0 0 2   SAB IAB Ectopic Multiple Live Births   0 0 0 0 2      # Outcome Date GA Lbr René/2nd Weight Sex Type Anes PTL Lv   3 Current            2 Term 22 38w0d  2.551 kg (5 lb 10 oz) F CS-LTranv Spinal N ABENA   1 Term 20 40w0d  3.345 kg (7 lb 6 oz) F CS-LTranv EPI N ABENA      Complications: Nuchal cord affecting delivery       Gyn History:   - LMP: Patient's last menstrual period was 2023 (exact date).  - Age at menarche: 10 years  - Menstrual hx: regular, 28 day cycles, 3 pads/day, 5 days per period  - History of birth control: oral contraceptive only  - History of STDs and/or Abnormal PAPs: none, last Pap , states normal.   - History of prior : x2    Past Medical History: no  Surgical History: CSx2  Family History: denies  Social History: denies currently. Admits to tobacco smoking before current pregnancy  Medications: PNVs  PCP: no    Review of Systems  Constitutional: no fever, no chills  CV: no chest pain  RESP: no SOB  : no dysuria, no hematuria  GI: no constipation, no diarrhea, no nausea, no vomiting  Psych: no depression, no anxiety    Antepartum specific   - Fetal movements: yes  - Vaginal bleeding: no  - Vaginal discharge: no  - Loss of fluid: no  - Contractions: no  - Headaches: no  - Vision changes: no  - Edema: no    Blood pressure 135/83, pulse 98, temperature 97.9 °F (36.6 °C), temperature source Oral, resp. rate 18,  "height 5' 3" (1.6 m), weight 96.6 kg (213 lb), last menstrual period 11/14/2023, SpO2 100%, unknown if currently breastfeeding.     Physical Exam   General: in no acute distress  RESP: clear to auscultation bilaterally, non labored  CV: regular rate and rhythm, no murmurs, no edema  ABD: gravid, nontender, BS+  FHTs: 130s bpm  Fundal height: 36 cm      Current Medications:   Current Outpatient Medications   Medication Sig Dispense Refill    PNV 11-iron fum-folic acid-om3 28 mg iron-1 mg -200 mg Cap Take 1 capsule by mouth once daily.       No current facility-administered medications for this visit.       Labs:  Urine dipstick:   Lab Results   Component Value Date    COLORU Yellow 07/25/2024    SPECGRAV 1.010 07/25/2024    PHUR 7.0 07/25/2024    WBCUR Negative 07/25/2024    NITRITE Negative 07/25/2024    PROTEINPOC Negative 07/25/2024    GLUCOSEUR Negative 07/25/2024    KETONESU 15 mg/dl 07/25/2024    UROBILINOGEN 1.0 07/25/2024    BILIRUBINPOC Negative 07/25/2024    RBCUR Negative 07/25/2024       Initial OB Labs: Collected 6/5/2024  - Blood Type and Rh: A+  - Antibody Screen: Neg  - CBC H/H: 10.4/31.2  - HIV: NR  - RPR: NR  - GC: Neg  - CT: Neg  - HBsAg: NR  - HCVAb: NR  - Rubella: Positive  - Varicella: Neg  - UA & Culture: unremarkable; No growth  - Sickle Cell Screen: Neg  - PAP: NIL  - Influenza vaccine date: N/A  - BTL desired: Forms signed 6/5/2024 (on media)    15-20 Weeks Lab  - Quad Screen: Not collected, late to prenatal care    28 Week Lab: Completed 6/5/2024  - 1H GTT: 164- failed  - 3H GTT (6/13/2024): Passed  - Rhogam: N/A  - Date of Tdap: 6/5/2024  - CBC H/H: N/A  - RPR: N/A  - BTL consent: Signed 6/5/2024     36 Week Lab: ordered on 07/25/24 - patient left without getting blood draw despite repeated reminders in clinic visit  - CBC H/H:   - Syphilis Ab:   - GBS Culture: pending  - HIV:  - Cervical GC: pending    Imaging:   Initial US 6/13/2024: Single intrauterine pregnancy with a fetal heart rate " of 141 beats per minute. Estimated age by ultrasound 30 weeks and 1 day 2 weeks and 1 day. Estimated date of delivery by ultrasound 2024  Anatomy Scan 24:   Impression   =========   A detailed fetal anatomic ultrasound examination was performed for the following high risk indication: BMI, Echogenic focus.   The fetus is cephalic.   No fetal structural malformations are identified; however, fetal imaging is incomplete today (AA, vermis, spine). An echogenic focus is noted in the left ventricle.   A follow-up study will be scheduled to complete the fetal anatomic survey.   Estimated fetal weight measures at the 18th percentile with abdominal circumference measuring at the 23rd percentile.   Amniotic fluid volume is normal.   Placental location is anterior without evidence of previa.     Recommendation   ==============   Suggest repeat scan in 4 weeks for growth.      Assessment and Plan:   1. 36 weeks gestation of pregnancy    - OB Protocol, PNVs   - Urine dip reviewed as above  - Routine (initial) labs: see above  - Mother plans to bottle (formula) feeding  - Postpartum contraception discussion: BTL   - FM Continuity patient after delivery: No  - Labor precautions discussed in depth; if vaginal bleeding or leaking fluid, belly cramping or pain, shortness of breath-chest pain, swelling of the face-hands, ankles and feet or legs. If don't feel the baby move in over an hour. Change in vision. Severe headache that are not resolved with medication    2) Prior history of pregnancy with FGR  Follows [facility] (Dr. Khan)  Last U/S on 24 showed Estimated fetal weight measures at the 18th percentile with abdominal circumference measuring at the 23rd percentile  Has repeat U/S rescheduled on 24 at 1040 with [facility] for repeat scan to evaluate for fetal growth prior to ; patient aware of this appt and plans to attend      - Return to clinic in 1 week for routine ob; patient will need her 36-37 week  labdraw in next clinic visit (unable to contact patient after visit); orders already placed; will need to schedule for repeat LTCS next visit        Braden Seals MD     [facility] Family Medicine Resident HO-1  07/25/2024

## 2024-07-27 LAB — BACTERIA SPEC CULT: NORMAL

## 2024-08-02 ENCOUNTER — OFFICE VISIT (OUTPATIENT)
Dept: FAMILY MEDICINE | Facility: CLINIC | Age: 24
End: 2024-08-02
Payer: MEDICAID

## 2024-08-02 VITALS
WEIGHT: 215.81 LBS | DIASTOLIC BLOOD PRESSURE: 74 MMHG | TEMPERATURE: 98 F | RESPIRATION RATE: 20 BRPM | HEIGHT: 63 IN | BODY MASS INDEX: 38.24 KG/M2 | SYSTOLIC BLOOD PRESSURE: 133 MMHG | HEART RATE: 88 BPM | OXYGEN SATURATION: 98 %

## 2024-08-02 DIAGNOSIS — Z3A.36 36 WEEKS GESTATION OF PREGNANCY: ICD-10-CM

## 2024-08-02 DIAGNOSIS — Z3A.37 37 WEEKS GESTATION OF PREGNANCY: Primary | ICD-10-CM

## 2024-08-02 LAB
BASOPHILS # BLD AUTO: 0.01 X10(3)/MCL
BASOPHILS NFR BLD AUTO: 0.2 %
BILIRUB SERPL-MCNC: NEGATIVE MG/DL
BLOOD URINE, POC: NEGATIVE
CLARITY, POC UA: NORMAL
COLOR, POC UA: YELLOW
EOSINOPHIL # BLD AUTO: 0.03 X10(3)/MCL (ref 0–0.9)
EOSINOPHIL NFR BLD AUTO: 0.7 %
ERYTHROCYTE [DISTWIDTH] IN BLOOD BY AUTOMATED COUNT: 14.7 % (ref 11.5–17)
GLUCOSE UR QL STRIP: NEGATIVE
HCT VFR BLD AUTO: 32.4 % (ref 37–47)
HGB BLD-MCNC: 10.5 G/DL (ref 12–16)
HIV 1+2 AB+HIV1 P24 AG SERPL QL IA: NONREACTIVE
IMM GRANULOCYTES # BLD AUTO: 0.02 X10(3)/MCL (ref 0–0.04)
IMM GRANULOCYTES NFR BLD AUTO: 0.5 %
KETONES UR QL STRIP: NEGATIVE
LEUKOCYTE ESTERASE URINE, POC: NEGATIVE
LYMPHOCYTES # BLD AUTO: 1.06 X10(3)/MCL (ref 0.6–4.6)
LYMPHOCYTES NFR BLD AUTO: 25.7 %
MCH RBC QN AUTO: 26.6 PG (ref 27–31)
MCHC RBC AUTO-ENTMCNC: 32.4 G/DL (ref 33–36)
MCV RBC AUTO: 82.2 FL (ref 80–94)
MONOCYTES # BLD AUTO: 0.37 X10(3)/MCL (ref 0.1–1.3)
MONOCYTES NFR BLD AUTO: 9 %
NEUTROPHILS # BLD AUTO: 2.63 X10(3)/MCL (ref 2.1–9.2)
NEUTROPHILS NFR BLD AUTO: 63.9 %
NITRITE, POC UA: NEGATIVE
NRBC BLD AUTO-RTO: 0 %
PH, POC UA: 6.5
PLATELET # BLD AUTO: 330 X10(3)/MCL (ref 130–400)
PMV BLD AUTO: 9.3 FL (ref 7.4–10.4)
PROTEIN, POC: NORMAL
RBC # BLD AUTO: 3.94 X10(6)/MCL (ref 4.2–5.4)
SPECIFIC GRAVITY, POC UA: 1.02
T PALLIDUM AB SER QL: NONREACTIVE
UROBILINOGEN, POC UA: NORMAL
WBC # BLD AUTO: 4.12 X10(3)/MCL (ref 4.5–11.5)

## 2024-08-02 PROCEDURE — 86780 TREPONEMA PALLIDUM: CPT

## 2024-08-02 PROCEDURE — 99213 OFFICE O/P EST LOW 20 MIN: CPT | Mod: PBBFAC

## 2024-08-02 PROCEDURE — 87389 HIV-1 AG W/HIV-1&-2 AB AG IA: CPT

## 2024-08-02 PROCEDURE — 85025 COMPLETE CBC W/AUTO DIFF WBC: CPT

## 2024-08-02 PROCEDURE — 36415 COLL VENOUS BLD VENIPUNCTURE: CPT

## 2024-08-02 NOTE — PROGRESS NOTES
[facility] OB OFFICE VISIT NOTE  Bhavesh Carreon  [id number]  2024      Chief Complaint: Follow-up (37 weeks 3 days o.b ,with lower abd. pain)      Bhavesh Carreon is a 23 y.o.  female 37w3d by  LMP consistent with third trimester ultrasound (MILA Estimated Date of Delivery: 24) presenting to [facility] for routine ob follow-up. Pregnancy complicated by late prenatal care.    Current Issues: Pt reports pelvic pressure. She denies contractions, vaginal bleeding, LOF. Reports good fetal movements.     Chronic Issues: none     Gestational History:  (date, GA, length labor, BW, sex, type, anes, place, complications)  OB History    Para Term  AB Living   3 2 2 0 0 2   SAB IAB Ectopic Multiple Live Births   0 0 0 0 2      # Outcome Date GA Lbr René/2nd Weight Sex Type Anes PTL Lv   3 Current            2 Term 22 38w0d  2.551 kg (5 lb 10 oz) F CS-LTranv Spinal N ABENA   1 Term 20 40w0d  3.345 kg (7 lb 6 oz) F CS-LTranv EPI N ABENA      Complications: Nuchal cord affecting delivery       Gyn History:   - LMP: Patient's last menstrual period was 2023 (exact date).  - Age at menarche: 10 years  - Menstrual hx: regular, 28 day cycles, 3 pads/day, 5 days per period  - History of birth control: oral contraceptive only  - History of STDs and/or Abnormal PAPs: none, last Pap , states normal.   - History of prior : x2    Past Medical History: no  Surgical History: CSx2  Family History: denies  Social History: denies currently. Admits to tobacco smoking before current pregnancy  Medications: PNVs  PCP: no    Review of Systems  Constitutional: no fever, no chills  CV: no chest pain  RESP: no SOB  : no dysuria, no hematuria  GI: no constipation, no diarrhea, no nausea, no vomiting  Psych: no depression, no anxiety    Antepartum specific   - Fetal movements: yes  - Vaginal bleeding: no  - Vaginal discharge: no  - Loss of fluid: no  - Contractions: no  - Headaches: no  - Vision changes:  "no  - Edema: no    Blood pressure 133/74, pulse 88, temperature 98.4 °F (36.9 °C), temperature source Oral, resp. rate 20, height 5' 3" (1.6 m), weight 97.9 kg (215 lb 12.8 oz), last menstrual period 11/14/2023, SpO2 98%, unknown if currently breastfeeding.     Physical Exam   General: in no acute distress  RESP: clear to auscultation bilaterally, non labored  CV: regular rate and rhythm, no murmurs, no edema  ABD: gravid, nontender, BS+  FHTs: 140s bpm  Fundal height: 37 cm      Current Medications:   Current Outpatient Medications   Medication Sig Dispense Refill    PNV 11-iron fum-folic acid-om3 28 mg iron-1 mg -200 mg Cap Take 1 capsule by mouth once daily.       No current facility-administered medications for this visit.       Labs:  Urine dipstick:   Lab Results   Component Value Date    COLORU Yellow 08/02/2024    SPECGRAV 1.020 08/02/2024    PHUR 6.5 08/02/2024    WBCUR negative 08/02/2024    NITRITE negative 08/02/2024    PROTEINPOC trace 08/02/2024    GLUCOSEUR negative 08/02/2024    KETONESU negative 08/02/2024    UROBILINOGEN 1.0 E.U./dL 08/02/2024    BILIRUBINPOC negative 08/02/2024    RBCUR negative 08/02/2024       Initial OB Labs: Collected 6/5/2024  - Blood Type and Rh: A+  - Antibody Screen: Neg  - CBC H/H: 10.4/31.2  - HIV: NR  - RPR: NR  - GC: Neg  - CT: Neg  - HBsAg: NR  - HCVAb: NR  - Rubella: Positive  - Varicella: Neg  - UA & Culture: unremarkable; No growth  - Sickle Cell Screen: Neg  - PAP: NIL  - Influenza vaccine date: N/A  - BTL desired: Forms signed 6/5/2024 (on media)    15-20 Weeks Lab  - Quad Screen: Not collected, late to prenatal care    28 Week Lab: Completed 6/5/2024  - 1H GTT: 164- failed  - 3H GTT (6/13/2024): Passed  - Rhogam: N/A  - Date of Tdap: 6/5/2024  - CBC H/H: N/A  - RPR: N/A  - BTL consent: Signed 6/5/2024     36 Week Lab: 07/25/24   - CBC H/H: 10.5/32.4  - Syphilis Ab: NR  - GBS Culture: Neg  - HIV: pending  - Cervical GC: Neg    Imaging:   Initial US 6/13/2024: " Single intrauterine pregnancy with a fetal heart rate of 141 beats per minute. Estimated age by ultrasound 30 weeks and 1 day 2 weeks and 1 day. Estimated date of delivery by ultrasound 2024  Anatomy Scan 24:   Impression   =========   A detailed fetal anatomic ultrasound examination was performed for the following high risk indication: BMI, Echogenic focus.   The fetus is cephalic.   No fetal structural malformations are identified; however, fetal imaging is incomplete today (AA, vermis, spine). An echogenic focus is noted in the left ventricle.   A follow-up study will be scheduled to complete the fetal anatomic survey.   Estimated fetal weight measures at the 18th percentile with abdominal circumference measuring at the 23rd percentile.   Amniotic fluid volume is normal.   Placental location is anterior without evidence of previa.     Recommendation   ==============   Suggest repeat scan in 4 weeks for growth.      Assessment and Plan:   1. 37 weeks gestation of pregnancy    2. 36 weeks gestation of pregnancy      - OB Protocol, PNVs   - Urine dip reviewed as above  - Routine (initial) labs: see above  - Mother plans to bottle (formula) feeding  - Postpartum contraception discussion: BTL   - FM Continuity patient after delivery: No  - Labor precautions discussed in depth; if vaginal bleeding or leaking fluid, belly cramping or pain, shortness of breath-chest pain, swelling of the face-hands, ankles and feet or legs. If don't feel the baby move in over an hour. Change in vision. Severe headache that are not resolved with medication    2) Prior history of pregnancy with FGR  Follows [facility] (Dr. Khan)  Last U/S on 24 showed Estimated fetal weight measures at the 18th percentile with abdominal circumference measuring at the 23rd percentile  Had repeat U/S rescheduled on 24 at 1040 with [facility] for repeat scan to evaluate for fetal growth prior to ; patient aware of this appt and plans  to attend      - Return to clinic in 1 week for routine ob; Need to schedule repeat C/S. Attempted to contact L&D and schedule while in clinic today but unable to reach . Will reach out again for scheduling.

## 2024-08-06 ENCOUNTER — TELEPHONE (OUTPATIENT)
Dept: FAMILY MEDICINE | Facility: CLINIC | Age: 24
End: 2024-08-06
Payer: MEDICAID

## 2024-08-06 ENCOUNTER — ANESTHESIA EVENT (OUTPATIENT)
Dept: OBSTETRICS AND GYNECOLOGY | Facility: HOSPITAL | Age: 24
End: 2024-08-06
Payer: MEDICAID

## 2024-08-09 ENCOUNTER — OFFICE VISIT (OUTPATIENT)
Dept: FAMILY MEDICINE | Facility: CLINIC | Age: 24
End: 2024-08-09
Payer: MEDICAID

## 2024-08-09 VITALS
OXYGEN SATURATION: 100 % | TEMPERATURE: 97 F | HEIGHT: 63 IN | SYSTOLIC BLOOD PRESSURE: 123 MMHG | HEART RATE: 83 BPM | BODY MASS INDEX: 37.95 KG/M2 | WEIGHT: 214.19 LBS | DIASTOLIC BLOOD PRESSURE: 80 MMHG

## 2024-08-09 DIAGNOSIS — Z3A.38 38 WEEKS GESTATION OF PREGNANCY: Primary | ICD-10-CM

## 2024-08-09 LAB
BILIRUB SERPL-MCNC: NEGATIVE MG/DL
BLOOD URINE, POC: NEGATIVE
CLARITY, POC UA: NORMAL
COLOR, POC UA: NORMAL
GLUCOSE UR QL STRIP: NEGATIVE
KETONES UR QL STRIP: NEGATIVE
LEUKOCYTE ESTERASE URINE, POC: NEGATIVE
NITRITE, POC UA: NEGATIVE
PH, POC UA: 6.5
PROTEIN, POC: NORMAL
SPECIFIC GRAVITY, POC UA: 1.03
UROBILINOGEN, POC UA: 1

## 2024-08-09 PROCEDURE — 81002 URINALYSIS NONAUTO W/O SCOPE: CPT | Mod: PBBFAC

## 2024-08-09 PROCEDURE — 99213 OFFICE O/P EST LOW 20 MIN: CPT | Mod: PBBFAC

## 2024-08-09 NOTE — PROGRESS NOTES
[facility] OB OFFICE VISIT NOTE  Bhavesh Carreon  [id number]  2024      Chief Complaint: Routine Prenatal Visit (OB 38 weeks 3 days. )      Bhavesh Carreon is a 23 y.o.  female 38w3d by  LMP consistent with third trimester ultrasound (MILA Estimated Date of Delivery: 24) presenting to [facility] for routine ob follow-up. Pregnancy complicated by late prenatal care.    Current Issues: Pt reports pelvic pressure. She denies contractions, vaginal bleeding, LOF. Reports good fetal movements.     Chronic Issues: none     Gestational History:  (date, GA, length labor, BW, sex, type, anes, place, complications)  OB History    Para Term  AB Living   3 2 2 0 0 2   SAB IAB Ectopic Multiple Live Births   0 0 0 0 2      # Outcome Date GA Lbr René/2nd Weight Sex Type Anes PTL Lv   3 Current            2 Term 22 38w0d  2.551 kg (5 lb 10 oz) F CS-LTranv Spinal N ABENA   1 Term 20 40w0d  3.345 kg (7 lb 6 oz) F CS-LTranv EPI N ABENA      Complications: Nuchal cord affecting delivery       Gyn History:   - LMP: Patient's last menstrual period was 2023 (exact date).  - Age at menarche: 10 years  - Menstrual hx: regular, 28 day cycles, 3 pads/day, 5 days per period  - History of birth control: oral contraceptive only  - History of STDs and/or Abnormal PAPs: none, last Pap , states normal.   - History of prior : x2    Past Medical History: no  Surgical History: CSx2  Family History: denies  Social History: denies currently. Admits to tobacco smoking before current pregnancy  Medications: PNVs  PCP: no    Review of Systems  Constitutional: no fever, no chills  CV: no chest pain  RESP: no SOB  : no dysuria, no hematuria  GI: no constipation, no diarrhea, no nausea, no vomiting  Psych: no depression, no anxiety    Antepartum specific   - Fetal movements: yes  - Vaginal bleeding: no  - Vaginal discharge: no  - Loss of fluid: no  - Contractions: no  - Headaches: no  - Vision changes: no  -  "Edema: no    Blood pressure 123/80, pulse 83, temperature 97.4 °F (36.3 °C), temperature source Oral, height 5' 3" (1.6 m), weight 97.2 kg (214 lb 3.2 oz), last menstrual period 11/14/2023, SpO2 100%, not currently breastfeeding.     Physical Exam   General: in no acute distress  RESP: clear to auscultation bilaterally, non labored  CV: regular rate and rhythm, no murmurs, no edema  ABD: gravid, nontender, BS+  FHTs: 140s bpm  Fundal height: 38cm      Current Medications:   Current Outpatient Medications   Medication Sig Dispense Refill    PNV 11-iron fum-folic acid-om3 28 mg iron-1 mg -200 mg Cap Take 1 capsule by mouth once daily.       No current facility-administered medications for this visit.       Labs:  Urine dipstick:   Lab Results   Component Value Date    COLORU Dark Yellow 08/09/2024    SPECGRAV 1.030 08/09/2024    PHUR 6.5 08/09/2024    WBCUR negative 08/09/2024    NITRITE negative 08/09/2024    PROTEINPOC 100mg/dL 08/09/2024    GLUCOSEUR negative 08/09/2024    KETONESU negative 08/09/2024    UROBILINOGEN 1.0 08/09/2024    BILIRUBINPOC negative 08/09/2024    RBCUR negative 08/09/2024       Initial OB Labs: Collected 6/5/2024  - Blood Type and Rh: A+  - Antibody Screen: Neg  - CBC H/H: 10.4/31.2  - HIV: NR  - RPR: NR  - GC: Neg  - CT: Neg  - HBsAg: NR  - HCVAb: NR  - Rubella: Positive  - Varicella: Neg  - UA & Culture: unremarkable; No growth  - Sickle Cell Screen: Neg  - PAP: NIL  - Influenza vaccine date: N/A  - BTL desired: Forms signed 6/5/2024 (on media)    15-20 Weeks Lab  - Quad Screen: Not collected, late to prenatal care    28 Week Lab: Completed 6/5/2024  - 1H GTT: 164- failed  - 3H GTT (6/13/2024): Passed  - Rhogam: N/A  - Date of Tdap: 6/5/2024  - CBC H/H: N/A  - RPR: N/A  - BTL consent: Signed 6/5/2024     36 Week Lab: 07/25/24   - CBC H/H: 10.5/32.4  - Syphilis Ab: NR  - GBS Culture: Neg  - HIV: NR  - Cervical GC: Neg    Imaging:   Initial US 6/13/2024: Single intrauterine pregnancy with a " fetal heart rate of 141 beats per minute. Estimated age by ultrasound 30 weeks and 1 day 2 weeks and 1 day. Estimated date of delivery by ultrasound August 21, 2024  Anatomy Scan 06/24/24:   Impression   =========   A detailed fetal anatomic ultrasound examination was performed for the following high risk indication: BMI, Echogenic focus.   The fetus is cephalic.   No fetal structural malformations are identified; however, fetal imaging is incomplete today (AA, vermis, spine). An echogenic focus is noted in the left ventricle.   A follow-up study will be scheduled to complete the fetal anatomic survey.   Estimated fetal weight measures at the 18th percentile with abdominal circumference measuring at the 23rd percentile.   Amniotic fluid volume is normal.   Placental location is anterior without evidence of previa.     Recommendation: Suggest repeat scan in 4 weeks for growth.      Assessment and Plan:   1. 38 weeks gestation of pregnancy      - OB Protocol, PNVs   - Urine dip reviewed as above  - Routine (initial) labs: see above  - Mother plans to bottle (formula) feeding  - Postpartum contraception discussion: BTL   - FM Continuity patient after delivery: No  - Labor precautions discussed in depth; if vaginal bleeding or leaking fluid, belly cramping or pain, shortness of breath-chest pain, swelling of the face-hands, ankles and feet or legs. If don't feel the baby move in over an hour. Change in vision. Severe headache that are not resolved with medication    2) Prior history of pregnancy with FGR  Followed by MFM (Dr. Khan)  Last U/S on 06/13/24 showed Estimated fetal weight measures at the 18th percentile with abdominal circumference measuring at the 23rd percentile  Had repeat U/S rescheduled on 08/01/24 with MFM for fetal growth monitoring, no showed.     C/S has been scheduled for Tuesday 8/13/24 at 8:30am. RTC for postpartum follow-up.

## 2024-08-12 NOTE — PROGRESS NOTES
Pre-op screening completed on patient. Pt states that cough started 4 days ago, denies fever, SOB, joint pain, etc. Instructed pt to report if any new symptoms occur to OBGYN and/or primary doctor.

## 2024-08-13 ENCOUNTER — HOSPITAL ENCOUNTER (INPATIENT)
Facility: HOSPITAL | Age: 24
LOS: 3 days | Discharge: HOME | End: 2024-08-16
Attending: OBSTETRICS & GYNECOLOGY | Admitting: OBSTETRICS & GYNECOLOGY
Payer: MEDICAID

## 2024-08-13 ENCOUNTER — ANESTHESIA (OUTPATIENT)
Dept: OBSTETRICS AND GYNECOLOGY | Facility: HOSPITAL | Age: 24
End: 2024-08-13
Payer: MEDICAID

## 2024-08-13 DIAGNOSIS — O34.219 PREVIOUS CESAREAN DELIVERY AFFECTING PREGNANCY: ICD-10-CM

## 2024-08-13 DIAGNOSIS — Z98.891 S/P CESAREAN SECTION: Primary | ICD-10-CM

## 2024-08-13 DIAGNOSIS — U07.1 COVID-19: ICD-10-CM

## 2024-08-13 PROBLEM — Z3A.39 39 WEEKS GESTATION OF PREGNANCY: Status: ACTIVE | Noted: 2024-08-13

## 2024-08-13 LAB
BASOPHILS # BLD AUTO: 0.01 X10(3)/MCL
BASOPHILS NFR BLD AUTO: 0.2 %
EOSINOPHIL # BLD AUTO: 0.04 X10(3)/MCL (ref 0–0.9)
EOSINOPHIL NFR BLD AUTO: 0.7 %
ERYTHROCYTE [DISTWIDTH] IN BLOOD BY AUTOMATED COUNT: 15.9 % (ref 11.5–17)
GROUP & RH: NORMAL
HCT VFR BLD AUTO: 30.4 % (ref 37–47)
HGB BLD-MCNC: 9.6 G/DL (ref 12–16)
HIV 1+2 AB+HIV1 P24 AG SERPL QL IA: NONREACTIVE
IMM GRANULOCYTES # BLD AUTO: 0.13 X10(3)/MCL (ref 0–0.04)
IMM GRANULOCYTES NFR BLD AUTO: 2.4 %
INDIRECT COOMBS: NORMAL
LYMPHOCYTES # BLD AUTO: 1.75 X10(3)/MCL (ref 0.6–4.6)
LYMPHOCYTES NFR BLD AUTO: 32.3 %
MCH RBC QN AUTO: 26.4 PG (ref 27–31)
MCHC RBC AUTO-ENTMCNC: 31.6 G/DL (ref 33–36)
MCV RBC AUTO: 83.5 FL (ref 80–94)
MONOCYTES # BLD AUTO: 0.48 X10(3)/MCL (ref 0.1–1.3)
MONOCYTES NFR BLD AUTO: 8.9 %
NEUTROPHILS # BLD AUTO: 3 X10(3)/MCL (ref 2.1–9.2)
NEUTROPHILS NFR BLD AUTO: 55.5 %
NRBC BLD AUTO-RTO: 0 %
PLATELET # BLD AUTO: 361 X10(3)/MCL (ref 130–400)
PLATELETS.RETICULATED NFR BLD AUTO: 2.3 % (ref 0.9–11.2)
PMV BLD AUTO: 10.3 FL (ref 7.4–10.4)
RBC # BLD AUTO: 3.64 X10(6)/MCL (ref 4.2–5.4)
SARS-COV-2 RNA RESP QL NAA+PROBE: POSITIVE
SPECIMEN OUTDATE: NORMAL
T PALLIDUM AB SER QL: NONREACTIVE
WBC # BLD AUTO: 5.41 X10(3)/MCL (ref 4.5–11.5)

## 2024-08-13 PROCEDURE — 71000033 HC RECOVERY, INTIAL HOUR: Performed by: OBSTETRICS & GYNECOLOGY

## 2024-08-13 PROCEDURE — 37000009 HC ANESTHESIA EA ADD 15 MINS: Performed by: OBSTETRICS & GYNECOLOGY

## 2024-08-13 PROCEDURE — 25000003 PHARM REV CODE 250: Performed by: OBSTETRICS & GYNECOLOGY

## 2024-08-13 PROCEDURE — 37000008 HC ANESTHESIA 1ST 15 MINUTES: Performed by: OBSTETRICS & GYNECOLOGY

## 2024-08-13 PROCEDURE — 36004724 HC OB OR TIME LEV III - 1ST 15 MIN: Performed by: OBSTETRICS & GYNECOLOGY

## 2024-08-13 PROCEDURE — 63600175 PHARM REV CODE 636 W HCPCS: Performed by: OBSTETRICS & GYNECOLOGY

## 2024-08-13 PROCEDURE — 27000207 HC ISOLATION

## 2024-08-13 PROCEDURE — 85025 COMPLETE CBC W/AUTO DIFF WBC: CPT | Performed by: OBSTETRICS & GYNECOLOGY

## 2024-08-13 PROCEDURE — 87389 HIV-1 AG W/HIV-1&-2 AB AG IA: CPT | Performed by: OBSTETRICS & GYNECOLOGY

## 2024-08-13 PROCEDURE — 11000001 HC ACUTE MED/SURG PRIVATE ROOM

## 2024-08-13 PROCEDURE — D9220A PRA ANESTHESIA: Mod: QY,ANES,, | Performed by: ANESTHESIOLOGY

## 2024-08-13 PROCEDURE — 25000003 PHARM REV CODE 250

## 2024-08-13 PROCEDURE — 63600175 PHARM REV CODE 636 W HCPCS

## 2024-08-13 PROCEDURE — D9220A PRA ANESTHESIA: Mod: QX,CRNA,,

## 2024-08-13 PROCEDURE — 87635 SARS-COV-2 COVID-19 AMP PRB: CPT | Performed by: OBSTETRICS & GYNECOLOGY

## 2024-08-13 PROCEDURE — 86901 BLOOD TYPING SEROLOGIC RH(D): CPT | Performed by: OBSTETRICS & GYNECOLOGY

## 2024-08-13 PROCEDURE — 86900 BLOOD TYPING SEROLOGIC ABO: CPT | Performed by: OBSTETRICS & GYNECOLOGY

## 2024-08-13 PROCEDURE — 86850 RBC ANTIBODY SCREEN: CPT | Performed by: OBSTETRICS & GYNECOLOGY

## 2024-08-13 PROCEDURE — 86780 TREPONEMA PALLIDUM: CPT | Performed by: OBSTETRICS & GYNECOLOGY

## 2024-08-13 PROCEDURE — 27000492 HC SLEEVE, SCD T/L

## 2024-08-13 PROCEDURE — 62322 NJX INTERLAMINAR LMBR/SAC: CPT

## 2024-08-13 PROCEDURE — 36004725 HC OB OR TIME LEV III - EA ADD 15 MIN: Performed by: OBSTETRICS & GYNECOLOGY

## 2024-08-13 PROCEDURE — 63600175 PHARM REV CODE 636 W HCPCS: Mod: JZ,JG | Performed by: ANESTHESIOLOGY

## 2024-08-13 PROCEDURE — 51702 INSERT TEMP BLADDER CATH: CPT

## 2024-08-13 RX ORDER — CEFAZOLIN SODIUM 2 G/50ML
2 SOLUTION INTRAVENOUS
Status: DISCONTINUED | OUTPATIENT
Start: 2024-08-13 | End: 2024-08-13

## 2024-08-13 RX ORDER — KETOROLAC TROMETHAMINE 30 MG/ML
30 INJECTION, SOLUTION INTRAMUSCULAR; INTRAVENOUS EVERY 8 HOURS
Status: DISCONTINUED | OUTPATIENT
Start: 2024-08-13 | End: 2024-08-13

## 2024-08-13 RX ORDER — ACETAMINOPHEN 10 MG/ML
INJECTION, SOLUTION INTRAVENOUS
Status: DISCONTINUED | OUTPATIENT
Start: 2024-08-13 | End: 2024-08-13

## 2024-08-13 RX ORDER — PHENYLEPHRINE HYDROCHLORIDE 10 MG/ML
INJECTION INTRAVENOUS
Status: DISCONTINUED | OUTPATIENT
Start: 2024-08-13 | End: 2024-08-13

## 2024-08-13 RX ORDER — OXYTOCIN-SODIUM CHLORIDE 0.9% IV SOLN 30 UNIT/500ML 30-0.9/5 UT/ML-%
10 SOLUTION INTRAVENOUS ONCE AS NEEDED
Status: DISCONTINUED | OUTPATIENT
Start: 2024-08-13 | End: 2024-08-16 | Stop reason: HOSPADM

## 2024-08-13 RX ORDER — SODIUM CITRATE AND CITRIC ACID MONOHYDRATE 334; 500 MG/5ML; MG/5ML
30 SOLUTION ORAL
Status: DISCONTINUED | OUTPATIENT
Start: 2024-08-13 | End: 2024-08-13

## 2024-08-13 RX ORDER — SIMETHICONE 80 MG
1 TABLET,CHEWABLE ORAL EVERY 6 HOURS PRN
Status: DISCONTINUED | OUTPATIENT
Start: 2024-08-13 | End: 2024-08-16 | Stop reason: HOSPADM

## 2024-08-13 RX ORDER — OXYTOCIN-SODIUM CHLORIDE 0.9% IV SOLN 30 UNIT/500ML 30-0.9/5 UT/ML-%
95 SOLUTION INTRAVENOUS ONCE AS NEEDED
Status: DISCONTINUED | OUTPATIENT
Start: 2024-08-13 | End: 2024-08-16 | Stop reason: HOSPADM

## 2024-08-13 RX ORDER — ONDANSETRON 4 MG/1
8 TABLET, ORALLY DISINTEGRATING ORAL EVERY 8 HOURS PRN
Status: DISCONTINUED | OUTPATIENT
Start: 2024-08-13 | End: 2024-08-16 | Stop reason: HOSPADM

## 2024-08-13 RX ORDER — GUAIFENESIN AND DEXTROMETHORPHAN HYDROBROMIDE 10; 100 MG/5ML; MG/5ML
10 SYRUP ORAL
Status: DISCONTINUED | OUTPATIENT
Start: 2024-08-13 | End: 2024-08-13

## 2024-08-13 RX ORDER — LIDOCAINE HYDROCHLORIDE 10 MG/ML
1 INJECTION, SOLUTION EPIDURAL; INFILTRATION; INTRACAUDAL; PERINEURAL ONCE
Status: CANCELLED | OUTPATIENT
Start: 2024-08-13 | End: 2024-08-13

## 2024-08-13 RX ORDER — HYDROMORPHONE HYDROCHLORIDE 2 MG/ML
0.2 INJECTION, SOLUTION INTRAMUSCULAR; INTRAVENOUS; SUBCUTANEOUS EVERY 5 MIN PRN
Status: CANCELLED | OUTPATIENT
Start: 2024-08-13

## 2024-08-13 RX ORDER — MORPHINE SULFATE 4 MG/ML
4 INJECTION, SOLUTION INTRAMUSCULAR; INTRAVENOUS
Status: DISCONTINUED | OUTPATIENT
Start: 2024-08-13 | End: 2024-08-16 | Stop reason: HOSPADM

## 2024-08-13 RX ORDER — MORPHINE SULFATE 1 MG/ML
INJECTION, SOLUTION EPIDURAL; INTRATHECAL; INTRAVENOUS
Status: DISCONTINUED | OUTPATIENT
Start: 2024-08-13 | End: 2024-08-13

## 2024-08-13 RX ORDER — OXYTOCIN-SODIUM CHLORIDE 0.9% IV SOLN 30 UNIT/500ML 30-0.9/5 UT/ML-%
10 SOLUTION INTRAVENOUS ONCE
Status: COMPLETED | OUTPATIENT
Start: 2024-08-13 | End: 2024-08-13

## 2024-08-13 RX ORDER — GLUCAGON 1 MG
1 KIT INJECTION
Status: CANCELLED | OUTPATIENT
Start: 2024-08-13

## 2024-08-13 RX ORDER — OXYTOCIN-SODIUM CHLORIDE 0.9% IV SOLN 30 UNIT/500ML 30-0.9/5 UT/ML-%
95 SOLUTION INTRAVENOUS ONCE
Status: DISCONTINUED | OUTPATIENT
Start: 2024-08-13 | End: 2024-08-16 | Stop reason: HOSPADM

## 2024-08-13 RX ORDER — AMOXICILLIN 250 MG
1 CAPSULE ORAL NIGHTLY PRN
Status: DISCONTINUED | OUTPATIENT
Start: 2024-08-13 | End: 2024-08-16 | Stop reason: HOSPADM

## 2024-08-13 RX ORDER — CARBOPROST TROMETHAMINE 250 UG/ML
250 INJECTION, SOLUTION INTRAMUSCULAR
Status: DISCONTINUED | OUTPATIENT
Start: 2024-08-13 | End: 2024-08-16 | Stop reason: HOSPADM

## 2024-08-13 RX ORDER — DIPHENHYDRAMINE HCL 25 MG
25 CAPSULE ORAL EVERY 4 HOURS PRN
Status: DISCONTINUED | OUTPATIENT
Start: 2024-08-13 | End: 2024-08-16 | Stop reason: HOSPADM

## 2024-08-13 RX ORDER — IBUPROFEN 600 MG/1
600 TABLET ORAL EVERY 6 HOURS
Status: DISCONTINUED | OUTPATIENT
Start: 2024-08-14 | End: 2024-08-13

## 2024-08-13 RX ORDER — MISOPROSTOL 100 UG/1
800 TABLET ORAL ONCE AS NEEDED
Status: DISCONTINUED | OUTPATIENT
Start: 2024-08-13 | End: 2024-08-16 | Stop reason: HOSPADM

## 2024-08-13 RX ORDER — LIDOCAINE HYDROCHLORIDE 20 MG/ML
INJECTION, SOLUTION EPIDURAL; INFILTRATION; INTRACAUDAL; PERINEURAL
Status: DISCONTINUED | OUTPATIENT
Start: 2024-08-13 | End: 2024-08-13

## 2024-08-13 RX ORDER — KETOROLAC TROMETHAMINE 30 MG/ML
30 INJECTION, SOLUTION INTRAMUSCULAR; INTRAVENOUS
Status: COMPLETED | OUTPATIENT
Start: 2024-08-13 | End: 2024-08-14

## 2024-08-13 RX ORDER — MISOPROSTOL 100 UG/1
800 TABLET ORAL
Status: DISCONTINUED | OUTPATIENT
Start: 2024-08-13 | End: 2024-08-13

## 2024-08-13 RX ORDER — SODIUM CHLORIDE 0.9 % (FLUSH) 0.9 %
3 SYRINGE (ML) INJECTION EVERY 6 HOURS PRN
Status: DISCONTINUED | OUTPATIENT
Start: 2024-08-13 | End: 2024-08-16 | Stop reason: HOSPADM

## 2024-08-13 RX ORDER — IBUPROFEN 600 MG/1
600 TABLET ORAL EVERY 6 HOURS
Status: DISCONTINUED | OUTPATIENT
Start: 2024-08-14 | End: 2024-08-16 | Stop reason: HOSPADM

## 2024-08-13 RX ORDER — DIPHENHYDRAMINE HYDROCHLORIDE 50 MG/ML
25 INJECTION INTRAMUSCULAR; INTRAVENOUS EVERY 6 HOURS PRN
Status: CANCELLED | OUTPATIENT
Start: 2024-08-13

## 2024-08-13 RX ORDER — OXYTOCIN-SODIUM CHLORIDE 0.9% IV SOLN 30 UNIT/500ML 30-0.9/5 UT/ML-%
95 SOLUTION INTRAVENOUS ONCE
Status: DISCONTINUED | OUTPATIENT
Start: 2024-08-13 | End: 2024-08-13

## 2024-08-13 RX ORDER — FENTANYL CITRATE 50 UG/ML
INJECTION, SOLUTION INTRAMUSCULAR; INTRAVENOUS
Status: DISCONTINUED | OUTPATIENT
Start: 2024-08-13 | End: 2024-08-13

## 2024-08-13 RX ORDER — CARBOPROST TROMETHAMINE 250 UG/ML
250 INJECTION, SOLUTION INTRAMUSCULAR
Status: DISCONTINUED | OUTPATIENT
Start: 2024-08-13 | End: 2024-08-13

## 2024-08-13 RX ORDER — MUPIROCIN 20 MG/G
OINTMENT TOPICAL
Status: DISCONTINUED | OUTPATIENT
Start: 2024-08-13 | End: 2024-08-13

## 2024-08-13 RX ORDER — OXYCODONE AND ACETAMINOPHEN 10; 325 MG/1; MG/1
1 TABLET ORAL EVERY 4 HOURS PRN
Status: DISCONTINUED | OUTPATIENT
Start: 2024-08-13 | End: 2024-08-16 | Stop reason: HOSPADM

## 2024-08-13 RX ORDER — OXYTOCIN 10 [USP'U]/ML
10 INJECTION, SOLUTION INTRAMUSCULAR; INTRAVENOUS ONCE AS NEEDED
Status: DISCONTINUED | OUTPATIENT
Start: 2024-08-13 | End: 2024-08-16 | Stop reason: HOSPADM

## 2024-08-13 RX ORDER — FAMOTIDINE 10 MG/ML
20 INJECTION INTRAVENOUS
Status: DISCONTINUED | OUTPATIENT
Start: 2024-08-13 | End: 2024-08-13

## 2024-08-13 RX ORDER — METHYLERGONOVINE MALEATE 0.2 MG/ML
200 INJECTION INTRAVENOUS ONCE AS NEEDED
Status: DISCONTINUED | OUTPATIENT
Start: 2024-08-13 | End: 2024-08-16 | Stop reason: HOSPADM

## 2024-08-13 RX ORDER — BUPIVACAINE HYDROCHLORIDE 7.5 MG/ML
INJECTION, SOLUTION EPIDURAL; RETROBULBAR
Status: COMPLETED | OUTPATIENT
Start: 2024-08-13 | End: 2024-08-13

## 2024-08-13 RX ORDER — METHYLERGONOVINE MALEATE 0.2 MG/ML
200 INJECTION INTRAVENOUS
Status: DISCONTINUED | OUTPATIENT
Start: 2024-08-13 | End: 2024-08-13

## 2024-08-13 RX ORDER — SODIUM CHLORIDE, SODIUM LACTATE, POTASSIUM CHLORIDE, CALCIUM CHLORIDE 600; 310; 30; 20 MG/100ML; MG/100ML; MG/100ML; MG/100ML
INJECTION, SOLUTION INTRAVENOUS CONTINUOUS
Status: CANCELLED | OUTPATIENT
Start: 2024-08-13

## 2024-08-13 RX ORDER — ONDANSETRON HYDROCHLORIDE 2 MG/ML
4 INJECTION, SOLUTION INTRAVENOUS DAILY PRN
Status: CANCELLED | OUTPATIENT
Start: 2024-08-13

## 2024-08-13 RX ORDER — OXYCODONE AND ACETAMINOPHEN 5; 325 MG/1; MG/1
1 TABLET ORAL EVERY 4 HOURS PRN
Status: DISCONTINUED | OUTPATIENT
Start: 2024-08-13 | End: 2024-08-16 | Stop reason: HOSPADM

## 2024-08-13 RX ORDER — PRENATAL WITH FERROUS FUM AND FOLIC ACID 3080; 920; 120; 400; 22; 1.84; 3; 20; 10; 1; 12; 200; 27; 25; 2 [IU]/1; [IU]/1; MG/1; [IU]/1; MG/1; MG/1; MG/1; MG/1; MG/1; MG/1; UG/1; MG/1; MG/1; MG/1; MG/1
1 TABLET ORAL DAILY
Status: DISCONTINUED | OUTPATIENT
Start: 2024-08-13 | End: 2024-08-16 | Stop reason: HOSPADM

## 2024-08-13 RX ORDER — ADHESIVE BANDAGE
30 BANDAGE TOPICAL 2 TIMES DAILY PRN
Status: DISCONTINUED | OUTPATIENT
Start: 2024-08-14 | End: 2024-08-16 | Stop reason: HOSPADM

## 2024-08-13 RX ORDER — ONDANSETRON HYDROCHLORIDE 2 MG/ML
INJECTION, SOLUTION INTRAVENOUS
Status: DISCONTINUED | OUTPATIENT
Start: 2024-08-13 | End: 2024-08-13

## 2024-08-13 RX ORDER — DIPHENOXYLATE HYDROCHLORIDE AND ATROPINE SULFATE 2.5; .025 MG/1; MG/1
2 TABLET ORAL EVERY 6 HOURS PRN
Status: DISCONTINUED | OUTPATIENT
Start: 2024-08-13 | End: 2024-08-16 | Stop reason: HOSPADM

## 2024-08-13 RX ORDER — KETOROLAC TROMETHAMINE 30 MG/ML
INJECTION, SOLUTION INTRAMUSCULAR; INTRAVENOUS
Status: DISCONTINUED | OUTPATIENT
Start: 2024-08-13 | End: 2024-08-13

## 2024-08-13 RX ORDER — SODIUM CHLORIDE, SODIUM LACTATE, POTASSIUM CHLORIDE, CALCIUM CHLORIDE 600; 310; 30; 20 MG/100ML; MG/100ML; MG/100ML; MG/100ML
INJECTION, SOLUTION INTRAVENOUS CONTINUOUS
Status: DISCONTINUED | OUTPATIENT
Start: 2024-08-13 | End: 2024-08-13

## 2024-08-13 RX ADMIN — ACETAMINOPHEN 1000 MG: 10 INJECTION, SOLUTION INTRAVENOUS at 09:08

## 2024-08-13 RX ADMIN — FENTANYL CITRATE 10 MCG: 50 INJECTION, SOLUTION INTRAMUSCULAR; INTRAVENOUS at 08:08

## 2024-08-13 RX ADMIN — ONDANSETRON 4 MG: 2 INJECTION INTRAMUSCULAR; INTRAVENOUS at 08:08

## 2024-08-13 RX ADMIN — MORPHINE SULFATE 4 MG: 4 INJECTION, SOLUTION INTRAMUSCULAR; INTRAVENOUS at 11:08

## 2024-08-13 RX ADMIN — DEXTROSE MONOHYDRATE 2 G: 50 INJECTION, SOLUTION INTRAVENOUS at 08:08

## 2024-08-13 RX ADMIN — SODIUM CHLORIDE, POTASSIUM CHLORIDE, SODIUM LACTATE AND CALCIUM CHLORIDE: 600; 310; 30; 20 INJECTION, SOLUTION INTRAVENOUS at 08:08

## 2024-08-13 RX ADMIN — PHENYLEPHRINE HYDROCHLORIDE 100 MCG: 10 INJECTION INTRAVENOUS at 08:08

## 2024-08-13 RX ADMIN — OXYCODONE AND ACETAMINOPHEN 1 TABLET: 10; 325 TABLET ORAL at 07:08

## 2024-08-13 RX ADMIN — GUAIFENESIN AND DEXTROMETHORPHAN 10 ML: 100; 10 SYRUP ORAL at 10:08

## 2024-08-13 RX ADMIN — Medication 334 ML/HR: at 09:08

## 2024-08-13 RX ADMIN — SODIUM CHLORIDE, POTASSIUM CHLORIDE, SODIUM LACTATE AND CALCIUM CHLORIDE: 600; 310; 30; 20 INJECTION, SOLUTION INTRAVENOUS at 09:08

## 2024-08-13 RX ADMIN — PHENYLEPHRINE HYDROCHLORIDE 100 MCG: 10 INJECTION INTRAVENOUS at 09:08

## 2024-08-13 RX ADMIN — KETOROLAC TROMETHAMINE 30 MG: 30 INJECTION, SOLUTION INTRAMUSCULAR; INTRAVENOUS at 10:08

## 2024-08-13 RX ADMIN — SODIUM CHLORIDE, POTASSIUM CHLORIDE, SODIUM LACTATE AND CALCIUM CHLORIDE 1000 ML: 600; 310; 30; 20 INJECTION, SOLUTION INTRAVENOUS at 07:08

## 2024-08-13 RX ADMIN — LIDOCAINE HYDROCHLORIDE 40 MG: 20 INJECTION, SOLUTION EPIDURAL; INFILTRATION; INTRACAUDAL; PERINEURAL at 09:08

## 2024-08-13 RX ADMIN — LIDOCAINE HYDROCHLORIDE 40 MG: 20 INJECTION, SOLUTION EPIDURAL; INFILTRATION; INTRACAUDAL; PERINEURAL at 10:08

## 2024-08-13 RX ADMIN — MORPHINE SULFATE 4 MG: 4 INJECTION, SOLUTION INTRAMUSCULAR; INTRAVENOUS at 02:08

## 2024-08-13 RX ADMIN — SODIUM CHLORIDE, POTASSIUM CHLORIDE, SODIUM LACTATE AND CALCIUM CHLORIDE: 600; 310; 30; 20 INJECTION, SOLUTION INTRAVENOUS at 06:08

## 2024-08-13 RX ADMIN — KETOROLAC TROMETHAMINE 30 MG: 30 INJECTION, SOLUTION INTRAMUSCULAR at 06:08

## 2024-08-13 RX ADMIN — OXYCODONE AND ACETAMINOPHEN 1 TABLET: 10; 325 TABLET ORAL at 11:08

## 2024-08-13 RX ADMIN — BUPIVACAINE HYDROCHLORIDE 1.8 ML: 7.5 INJECTION, SOLUTION EPIDURAL; RETROBULBAR at 08:08

## 2024-08-13 RX ADMIN — SODIUM CITRATE AND CITRIC ACID MONOHYDRATE 30 ML: 500; 334 SOLUTION ORAL at 07:08

## 2024-08-13 RX ADMIN — MORPHINE SULFATE 0.1 MG: 1 INJECTION, SOLUTION EPIDURAL; INTRATHECAL; INTRAVENOUS at 08:08

## 2024-08-13 NOTE — PROGRESS NOTES
Received report from [facility] Marilu Pate this morning regarding referral for family assessment. UDS and MDS being collected due to late prenatal care. LCSW has plans to allow mom and baby to rest and will complete  MDS and family assessment via phone due to COVID isolation in the morning. UDS and MDS collection still pending on baby at this time.

## 2024-08-13 NOTE — ANESTHESIA PROCEDURE NOTES
Spinal    Diagnosis: Intrauterine pregnancy  Patient location during procedure: OB  Start time: 8/13/2024 8:48 AM  Timeout: 8/13/2024 8:48 AM  End time: 8/13/2024 8:51 AM    Staffing  Authorizing Provider: Júnior Syed MD  Performing Provider: Davi Holt CRNA    Staffing  Performed by: Davi Holt CRNA  Authorized by: Júnior Syed MD    Preanesthetic Checklist  Completed: patient identified, IV checked, site marked, risks and benefits discussed, surgical consent, monitors and equipment checked, pre-op evaluation and timeout performed  Spinal Block  Patient position: sitting  Prep: ChloraPrep  Patient monitoring: heart rate, cardiac monitor, frequent blood pressure checks and continuous pulse ox  Approach: midline  Location: L3-4  Injection technique: single shot  CSF Fluid: clear free-flowing CSF  Needle  Needle type: pencil-tip   Needle gauge: 25 G  Needle length: 3.5 in  Additional Documentation: incremental injection, negative aspiration for heme and no paresthesia on injection  Needle localization: anatomical landmarks  Assessment  Ease of block: easy  Patient's tolerance of the procedure: comfortable throughout block and no complaints  Medications:    Medications: bupivacaine (pf) (MARCAINE) injection 0.75% - Intraspinal   1.8 mL - 8/13/2024 8:51:00 AM

## 2024-08-13 NOTE — L&D DELIVERY NOTE
[facility] - 2nd [facility] Mother/Baby Unit   Section   Operative Note    SUMMARY     Date of Procedure: 2024      Procedure:   Procedure(s) (LRB):   SECTION (N/A)    Surgeon(s) and Role:     * Son Whaley MD - Primary    Assisting Surgeon: DAMON Lemus MD    Pre-Operative Diagnosis: [id number], prior c/s X 2    Post-Operative Diagnosis: Same    Anesthesia: Spinal    Technical Procedures Used: Repeat low transverse  section           Description of the Findings of the Procedure: VMI, cephalic. Normal Uterus but very thin lower segment, tubes ovaries     With informed consent, the patient was brought to the OR and spinal anesthesia was administered.  She was placed in the supine position and a Devine Catheter was inserted. The abdomen was prepped with Chloroprep and 3 minute drying time was allowed prior to draping of the abdomen. A time out was taken with OR team members and the patient and procedure were identified.  Pfannenstiel Incision made through the skin, a transverse fascial incision developed, the rectus muscles were  in the midline and the peritoneum entered bluntly.  No adhesions were noted. The lower uterine segment and position of the fetus were identified. A bladder flap was taken down through a transverse peritoneal incision.  A low transverse incision was made through a well developed lower uterine segment and extended laterally with blunt dissection.  An amniotomy was performed and clear fluid was noted.  Infant delivered from vertex presentation.  The cord was clamped after one minute and the  was handed to attending nurse.  Cord blood was taken, and the placenta delivered with cord traction and fundal massage.  The uterus was externalized.  Debris was swept from the endometrial cavity with a moist lap sponge.  The hysterotomy was closed with running, locking 0 Chromic. It was imbricated restoring the lower uterine segment integrity after  careful and adequate mobilization of the bladder.  Observation for bleeding performed, with suture ligation of bleeding along the hysterotomy. Hemostasis was noted. The right and left adnexa were normal in appearance. The uterus was placed in the peritoneal cavity and hemostasis was observed after careful inspection.  The pericolic gutters were suctioned until clear. Hemostasis was again observed after cautery or suture where needed.     Closure of the abdomen with 2-0 Chromic running of the peritoneum , 1 Vicryl running of the fascia, and skin closure with 2-0 plain gut subcutaneous and 4-0 Monocryl subcuticular.  Counts correct X 2 and patient transferred to recovery in stable condition.       Complications: None    Specimens:   Specimen (24h ago, onward)      None            Delivery Information for John Carreon    Birth information:  YOB: 2025   Time of birth: 9:29 AM   Sex: male   Head Delivery Date/Time: 2024  9:29 AM   Delivery type: , Low Transverse   Gestational Age: 39w0d        Delivery Providers    Delivering clinician: Son Whaley MD   Provider Role    Dominick Johnson, RN Registered Nurse              Measurements    Weight:   Length:          Apgars    Living status:   Apgar Component Scores:  1 min.:  5 min.:  10 min.:  15 min.:  20 min.:    Skin color:         Heart rate:         Reflex irritability:         Muscle tone:         Respiratory effort:         Total:                  Operative Delivery    Forceps attempted?: No  Vacuum extractor attempted?: No         Shoulder Dystocia    Shoulder dystocia present?: No           Presentation    Presentation: Vertex           Interventions/Resuscitation           Cord    Vessels: 3 vessels  Complications: Nuchal  Nuchal Intervention: reduced  Nuchal Cord Description: loose nuchal cord  Number of Loops: 1  Delayed Cord Clamping?: No  Cord Clamped Date/Time: 2024  8:29 AM  Cord Blood Disposition: Sent with  Baby  Gases Sent?: No  Stem Cell Collection (by MD): No       Placenta    Placenta delivery date/time: 2024 0831  Placenta removal: Manual removal  Placenta appearance: Intact  Placenta disposition: Discarded           Labor Events:       labor:       Labor Onset Date/Time:         Dilation Complete Date/Time:         Start Pushing Date/Time:       Rupture Date/Time: 24         Rupture type: ARM (Artificial Rupture)         Fluid Amount:       Fluid Color: Clear       steroids:       Antibiotics given for GBS:       Induction:       Indications for induction:        Augmentation:       Indications for augmentation:       Labor complications:       Additional complications:          Cervical ripening:                     Delivery:      Episiotomy:       Indication for Episiotomy:       Perineal Lacerations:   Repaired:      Periurethral Laceration:   Repaired:     Labial Laceration:   Repaired:     Sulcus Laceration:   Repaired:     Vaginal Laceration:   Repaired:     Cervical Laceration:   Repaired:     Repair suture:       Repair # of packets:       Last Value - EBL - Nursing (mL):       Sum - EBL - Nursing (mL): 0     Last Value - EBL - Anesthesia (mL):      Calculated QBL (mL): 640      Running total QBL (mL): 640      Vaginal Sweep Performed:       Surgicount Correct:         Other providers:       Anesthesia    Method: Spinal          Details (if applicable):  Trial of Labor No    Categorization: Repeat    Priority: Routine   Indications for : Repeat Section   Incision Type: low transverse     Additional  information:  Forceps:    Vacuum:    Breech:    Observed anomalies    Other (Comments):           Condition: Stable    Disposition: PACU - hemodynamically stable.

## 2024-08-13 NOTE — TRANSFER OF CARE
"Anesthesia Transfer of Care Note    Patient: Bhavesh Carreon    Procedure(s) Performed: Procedure(s) (LRB):   SECTION (N/A)    Patient location: Labor and Delivery    Anesthesia Type: spinal    Transport from OR: Transported from OR on room air with adequate spontaneous ventilation    Post pain: adequate analgesia    Post assessment: no apparent anesthetic complications    Post vital signs: stable    Level of consciousness: awake, alert and oriented    Nausea/Vomiting: no nausea/vomiting    Complications: none    Transfer of care protocol was followed    Last vitals: Visit Vitals  BP (!) 157/79   Pulse 84   Ht 5' 3" (1.6 m)   LMP 2023 (Exact Date)   SpO2 100%   Breastfeeding No   BMI 37.94 kg/m²     "

## 2024-08-13 NOTE — ANESTHESIA PREPROCEDURE EVALUATION
"                                                                                                             2024  Bhavesh Carreon is a 23 y.o., female  with IUP at 39 weeks for repeat C section today.    "Ms. Bhavesh Carreon is a 23 y.o.  female with IUP at 31w6d who is seen in consultation by M for evaluation and management of:  Problem   History of Prior Pregnancy With Iugr Dobson   BMI affecting pregnancy in third trimester   Late Prenatal Care Affecting Pregnancy in Third Trimester      Bhavesh presents for consultation due to a history of FGR (5#10 @38w). She denies any pregnancy related complications during that pregnancy.  She had late entry into care. She recalls her LMP, however, did not seek care due to hiding the pregnancy from her family. She says they help her with childcare a lot and she was afraid to tell them there was another baby coming. She is planning BTL this time.  BMI 36. No early 1h as she had late PNC. Routine 1h-failed. 3h OGTT passed.  Declines NIPT politely.          Medication List with Changes/Refills   Current Medications     PNV 11-IRON FUM-FOLIC ACID-OM3 28 MG IRON-1 MG -200 MG CAP    Take 1 capsule by mouth once daily.         Review of patient's allergies indicates:  No Known Allergies     PMH:       Past Medical History:   Diagnosis Date    Asthma       childhood      Past Surgical History:   Procedure Laterality Date     SECTION     ...    23 y.o.  female with IUP at 31w6d      History of prior pregnancy with IUGR   I reviewed the patient's obstetric history which is remarkable for a previous pregnancy complicated by FGR. No other conditions noted during that pregnancy. I counseled the patient regarding the recurrence risk for FGR (however the exact number is not clear, likely ~15-20% recurrence.). We discussed recommendations for management during this pregnancy including evaluation of fetal growth in the third trimester. Low molecular " "weight heparin and aspirin have not been beneficial in preventing recurrent FGR. If the patient had preeclampsia or gestational hypertension associated with FGR, low dose aspirin therapy should be initiated at 12-16 weeks in subsequent pregnancies. Patient was counseled on modifiable risk factors including tobacco cessation, abstinence from alcohol and drug use, and maternal diet (ensuring appropriate gestational weight gain). "       Pre-op Assessment    I have reviewed the Patient Summary Reports.     I have reviewed the Nursing Notes. I have reviewed the NPO Status.   I have reviewed the Medications.     Review of Systems  Anesthesia Hx:  No problems with previous Anesthesia             Denies Family Hx of Anesthesia complications.    Denies Personal Hx of Anesthesia complications.                    Cardiovascular:  Cardiovascular Normal Exercise tolerance: good          Denies Angina.                                  Pulmonary:    Asthma                        Physical Exam  General: Well nourished, Cooperative, Alert and Oriented    Airway:  Mallampati: II   Mouth Opening: Normal  TM Distance: Normal  Tongue: Normal  Neck ROM: Normal ROM    Dental:  Intact    Chest/Lungs:  Clear to auscultation, Normal Respiratory Rate    Heart:  Rate: Normal  Rhythm: Regular Rhythm        Anesthesia Plan  Type of Anesthesia, risks & benefits discussed:    Anesthesia Type: Spinal  Intra-op Monitoring Plan: Standard ASA Monitors  Post Op Pain Control Plan: multimodal analgesia  Informed Consent: Informed consent signed with the Patient and all parties understand the risks and agree with anesthesia plan.  All questions answered. Patient consented to blood products? Yes  ASA Score: 2  Day of Surgery Review of History & Physical: H&P Update referred to the surgeon/provider.    Ready For Surgery From Anesthesia Perspective.     .      "

## 2024-08-13 NOTE — H&P
HISTORY AND PHYSICAL                                                OBSTETRICS          Subjective:      Bhavesh Carreon is a 23 y.o.  female with IUP at 39w0d weeks gestation who presents to L&D for repeat . Pertinent medical history for this pregnancy includes late prenatal care initially presenting at 30w2d, varicella nonimmune..  Patient reports Feeling fetal movement, not having regular contractions, and overall feeling well this morning.  She denies contractions, vaginal bleeding, leakage of fluid, and decreased fetal movement.  Care this pregnancy has [facility] with [facility] family medicine    PMHx:   Past Medical History:   Diagnosis Date    Asthma     childhood       PSHx:   Past Surgical History:   Procedure Laterality Date     SECTION         All: Review of patient's allergies indicates:  No Known Allergies    Meds:   Medications Prior to Admission   Medication Sig Dispense Refill Last Dose    PNV 11-iron fum-folic acid-om3 28 mg iron-1 mg -200 mg Cap Take 1 capsule by mouth once daily.   2024       SH:   Social History     Socioeconomic History    Marital status: Single   Tobacco Use    Smoking status: Former     Types: Cigarettes     Start date: 2023     Quit date: 2023     Years since quittin.6     Passive exposure: Past    Smokeless tobacco: Never   Substance and Sexual Activity    Alcohol use: Never    Drug use: Never    Sexual activity: Yes     Partners: Male     Social Determinants of Health     Financial Resource Strain: Low Risk  (2024)    Overall Financial Resource Strain (CARDIA)     Difficulty of Paying Living Expenses: Not hard at all   Recent Concern: Financial Resource Strain - Medium Risk (2024)    Overall Financial Resource Strain (CARDIA)     Difficulty of Paying Living Expenses: Somewhat hard   Food Insecurity: No Food Insecurity (2024)    Hunger Vital Sign     Worried About Running Out of Food in the Last Year: Never true     Ran Out of Food  in the Last Year: Never true   Transportation Needs: No Transportation Needs (2024)    TRANSPORTATION NEEDS     Transportation : No   Physical Activity: Insufficiently Active (2024)    Exercise Vital Sign     Days of Exercise per Week: 5 days     Minutes of Exercise per Session: 20 min   Stress: No Stress Concern Present (2024)    [employer] [place] [employer] - Occupational Stress Questionnaire     Feeling of Stress : Only a little   Housing Stability: Low Risk  (2024)    Housing Stability Vital Sign     Unable to Pay for Housing in the Last Year: No     Homeless in the Last Year: No       FH:   Family History   Problem Relation Name Age of Onset    No Known Problems Father      No Known Problems Mother         OBHx:   OB History    Para Term  AB Living   3 2 2 0 0 2   SAB IAB Ectopic Multiple Live Births   0 0 0 0 2      # Outcome Date GA Lbr René/2nd Weight Sex Type Anes PTL Lv   3 Current            2 Term 22 38w0d  2.551 kg (5 lb 10 oz) F CS-LTranv Spinal N ABENA   1 Term 20 40w0d  3.345 kg (7 lb 6 oz) F CS-LTranv EPI N BAENA      Complications: Nuchal cord affecting delivery       Objective:      BP (!) 157/79   LMP 2023 (Exact Date)   Breastfeeding No   There is no height or weight on file to calculate BMI.    General:   alert and cooperative   HEENT:  normocephalic, atraumatic   Lungs:   clear to auscultation bilaterally   Heart:   regular rate and rhythm, S1, S2 normal   Abdomen:  gravid, non-tender   Extremities non-tender, no edema   Derm: no rashes or lesions   Psych: appropriate mood and affect   Pelvis:  adequate           Lab Review  Blood Type A POS  GBBS: negative   Rubella: immune  RPR: Nonreactive  HIV: nonreactive  HepB: nonreactive  Hep BsAg Interp   Date Value Ref Range Status   2024 Nonreactive Nonreactive Final        Lab Results   Component Value Date    WBC 5.41 2024    HGB 9.6 (L) 2024    HCT 30.4 (L) 2024     MCV 83.5 2024     2024           Assessment:     23 y.o.  at 39w0d weeks gestation presenting for scheduled repeat LTCS      Active Hospital Problems    Diagnosis  POA    *39 weeks gestation of pregnancy [Z3A.39]  Not Applicable      Resolved Hospital Problems   No resolved problems to display.          Plan:     1. Risks, benefits, alternatives and possible complications have been discussed in detail with the patient. All questions have been answered, and Ms. Carreon has voiced understanding and agrees to the treatment plan.  2. Consents signed and in chart  3. Admit to Labor and Delivery unit for repeat LTCS  4.  Expectant management afterwards  5. Likely discharge on post-op day 3    Plan discussed with Dr. Judd Lemus MD  [facility] HO-2

## 2024-08-14 LAB
ABO + RH BLD: NORMAL
BASOPHILS # BLD AUTO: 0.02 X10(3)/MCL
BASOPHILS NFR BLD AUTO: 0.3 %
BLD PROD TYP BPU: NORMAL
BLOOD UNIT EXPIRATION DATE: NORMAL
BLOOD UNIT TYPE CODE: 600
BLOOD UNIT TYPE CODE: 6200
BLOOD UNIT TYPE CODE: 6200
CROSSMATCH INTERPRETATION: NORMAL
DISPENSE STATUS: NORMAL
EOSINOPHIL # BLD AUTO: 0.03 X10(3)/MCL (ref 0–0.9)
EOSINOPHIL NFR BLD AUTO: 0.4 %
ERYTHROCYTE [DISTWIDTH] IN BLOOD BY AUTOMATED COUNT: 16.1 % (ref 11.5–17)
HCT VFR BLD AUTO: 18 % (ref 37–47)
HGB BLD-MCNC: 5.5 G/DL (ref 12–16)
IMM GRANULOCYTES # BLD AUTO: 0.07 X10(3)/MCL (ref 0–0.04)
IMM GRANULOCYTES NFR BLD AUTO: 1 %
LYMPHOCYTES # BLD AUTO: 1.45 X10(3)/MCL (ref 0.6–4.6)
LYMPHOCYTES NFR BLD AUTO: 20.1 %
MCH RBC QN AUTO: 26.2 PG (ref 27–31)
MCHC RBC AUTO-ENTMCNC: 30.6 G/DL (ref 33–36)
MCV RBC AUTO: 85.7 FL (ref 80–94)
MONOCYTES # BLD AUTO: 0.64 X10(3)/MCL (ref 0.1–1.3)
MONOCYTES NFR BLD AUTO: 8.9 %
NEUTROPHILS # BLD AUTO: 4.99 X10(3)/MCL (ref 2.1–9.2)
NEUTROPHILS NFR BLD AUTO: 69.3 %
NRBC BLD AUTO-RTO: 0 %
PLATELET # BLD AUTO: 321 X10(3)/MCL (ref 130–400)
PLATELETS.RETICULATED NFR BLD AUTO: 2.1 % (ref 0.9–11.2)
PMV BLD AUTO: 9.9 FL (ref 7.4–10.4)
RBC # BLD AUTO: 2.1 X10(6)/MCL (ref 4.2–5.4)
UNIT NUMBER: NORMAL
WBC # BLD AUTO: 7.2 X10(3)/MCL (ref 4.5–11.5)

## 2024-08-14 PROCEDURE — 25000003 PHARM REV CODE 250: Performed by: OBSTETRICS & GYNECOLOGY

## 2024-08-14 PROCEDURE — 25000003 PHARM REV CODE 250

## 2024-08-14 PROCEDURE — 36430 TRANSFUSION BLD/BLD COMPNT: CPT

## 2024-08-14 PROCEDURE — 85025 COMPLETE CBC W/AUTO DIFF WBC: CPT | Performed by: OBSTETRICS & GYNECOLOGY

## 2024-08-14 PROCEDURE — 86923 COMPATIBILITY TEST ELECTRIC: CPT

## 2024-08-14 PROCEDURE — 27000207 HC ISOLATION

## 2024-08-14 PROCEDURE — P9016 RBC LEUKOCYTES REDUCED: HCPCS

## 2024-08-14 PROCEDURE — 11000001 HC ACUTE MED/SURG PRIVATE ROOM

## 2024-08-14 PROCEDURE — P9040 RBC LEUKOREDUCED IRRADIATED: HCPCS

## 2024-08-14 PROCEDURE — 36415 COLL VENOUS BLD VENIPUNCTURE: CPT | Performed by: OBSTETRICS & GYNECOLOGY

## 2024-08-14 PROCEDURE — 63600175 PHARM REV CODE 636 W HCPCS: Performed by: OBSTETRICS & GYNECOLOGY

## 2024-08-14 RX ORDER — GUAIFENESIN AND DEXTROMETHORPHAN HYDROBROMIDE 10; 100 MG/5ML; MG/5ML
10 SYRUP ORAL EVERY 4 HOURS PRN
Status: DISCONTINUED | OUTPATIENT
Start: 2024-08-14 | End: 2024-08-16 | Stop reason: HOSPADM

## 2024-08-14 RX ORDER — HYDROCODONE BITARTRATE AND ACETAMINOPHEN 500; 5 MG/1; MG/1
TABLET ORAL
Status: DISCONTINUED | OUTPATIENT
Start: 2024-08-14 | End: 2024-08-16 | Stop reason: HOSPADM

## 2024-08-14 RX ADMIN — OXYCODONE AND ACETAMINOPHEN 1 TABLET: 10; 325 TABLET ORAL at 08:08

## 2024-08-14 RX ADMIN — OXYCODONE AND ACETAMINOPHEN 1 TABLET: 10; 325 TABLET ORAL at 03:08

## 2024-08-14 RX ADMIN — IBUPROFEN 600 MG: 600 TABLET, FILM COATED ORAL at 06:08

## 2024-08-14 RX ADMIN — OXYCODONE AND ACETAMINOPHEN 1 TABLET: 10; 325 TABLET ORAL at 12:08

## 2024-08-14 RX ADMIN — KETOROLAC TROMETHAMINE 30 MG: 30 INJECTION, SOLUTION INTRAMUSCULAR at 10:08

## 2024-08-14 RX ADMIN — GUAIFENESIN AND DEXTROMETHORPHAN 10 ML: 100; 10 SYRUP ORAL at 11:08

## 2024-08-14 RX ADMIN — KETOROLAC TROMETHAMINE 30 MG: 30 INJECTION, SOLUTION INTRAMUSCULAR at 01:08

## 2024-08-14 RX ADMIN — OXYCODONE AND ACETAMINOPHEN 1 TABLET: 10; 325 TABLET ORAL at 04:08

## 2024-08-14 RX ADMIN — OXYCODONE AND ACETAMINOPHEN 1 TABLET: 10; 325 TABLET ORAL at 07:08

## 2024-08-14 NOTE — PROGRESS NOTES
Post Partum  Delivery Progress Note:     Subjective  Estasia Velma is 23 y.o.   s/p LTCS, POD/PPD #1.   Patient resting comfortably in bed. Patient is very sensitive to touch and is in pain after medications wear off. Pain is tolerable with medications. Lochia similar to her menses and patient denied any overt vaginal bleeding. Pad over incision has not been changed since OR and was saturated.  Tolerating regular diet without nausea/vomiting. Voiding clear urine  and passing flatus. Ambulating. Infant at bedside.     Denies headache, blurry vision, dizziness, chest pain, shortness of breath, RUQ pain, or calf pain.     Objective:  Vitals:    24 2322 24 2330 24 0430 24 0436   BP:  123/73 133/85    BP Location:       Patient Position:       Pulse:  78 90    Resp: 14   14   Temp:  98.5 °F (36.9 °C) 98.6 °F (37 °C)    TempSrc:  Oral Oral    SpO2:  100% 99%    Height:           General:  Alert and oriented, in no acute distress   Lungs:  Clear to auscultation bilaterally   Heart::  Regular rate and rhythm   Abdomen  Soft, hypersensitive tenderness to palpation to lower abdomen, nondistended, normoactive bowel sounds    Pelvic:  Scant blood on vaginal pad   Uterine Fundus:   Firm, below the umbilicus    Incision:   bandage with dried blood, incision intact and dry, ttp surrounding incision. No dehiscence of incision noted, no blood expressed along incision   Extremities:  No erythema, cords, or tenderness to palpation in bilateral extremities, trace edema       Intake/Output Summary (Last 24 hours) at 2024 0549  Last data filed at 2024 1800  Gross per 24 hour   Intake 1600 ml   Output 1290 ml   Net 310 ml       Recent Labs   Lab 24  0648   WBC 5.41   HGB 9.6*      MCV 83.5       Medications   ketorolac  30 mg Intravenous Q8H    Followed by    ibuprofen  600 mg Oral Q6H    oxytocin  95 leonardo-units/min Intravenous Once    prenatal vitamin  1 tablet Oral Daily          Current Facility-Administered Medications:     carboprost, 250 mcg, Intramuscular, Q15 Min PRN    diphenhydrAMINE, 25 mg, Oral, Q4H PRN    diphenoxylate-atropine 2.5-0.025 mg, 2 tablet, Oral, Q6H PRN    lanolin, , Topical (Top), PRN    magnesium hydroxide 400 mg/5 ml, 30 mL, Oral, BID PRN    measles, mumps and rubella vaccine, 0.5 mL, Subcutaneous, vaccine x 1 dose    methylergonovine, 200 mcg, Intramuscular, Once PRN    miSOPROStoL, 800 mcg, Rectal, Once PRN    miSOPROStoL, 800 mcg, Oral, Once PRN    morphine, 4 mg, Intravenous, Q3H PRN    ondansetron, 8 mg, Oral, Q8H PRN    oxyCODONE-acetaminophen, 1 tablet, Oral, Q4H PRN    oxyCODONE-acetaminophen, 1 tablet, Oral, Q4H PRN    oxytocin, 10.0002 Units, Intravenous, Once PRN    oxytocin, 95 leonardo-units/min, Intravenous, Once PRN    oxytocin, 10 Units, Intramuscular, Once PRN    senna-docusate 8.6-50 mg, 1 tablet, Oral, Nightly PRN    simethicone, 1 tablet, Oral, Q6H PRN    sodium chloride 0.9%, 3 mL, Intravenous, Q6H PRN    tranexamic acid (CYKLOKAPRON) infusion, 1,000 mg, Intravenous, Q30 Min PRN    Assessment/Plan  Bhavesh Carreon is a 23 y.o.   s/p rLTCS , POD # 1. Doing well. Pregnancy/Postpartum course complicated by severe drop in hgb     Postoperative state: Regular Diet. Discontinue IVF. Discontinue Rodney. Encourage ambulation once rodney removed. Continue routine postpartum/postop care.   Asymptomatic anemia:   Antepartum H/H 9.6/30.4 --> [id number]--> postpartum H/H 5.5/18.0.   No signs/symptoms of anemia this AM, continue to monitor  Will transfuse 2u pRBC and recheck hgb 2/2 severe drop  Low suspicion for subq bleeding from incision or vaginal bleeding  Will continue to monitor for any further drop in hgb  Feeding: Bottle feeding, continue to encourage  Pain: Scheduled tylenol and ibuprofen, PRN oxycodone for breakthrough pain  Prophylaxis: SCD, Incentive Spirometry  PPBCM: Interested in IUD   Dispo: continue to monitor, anticipate discharge to  home on POD#3 if meeting all goals.    Discussed with staff, Dr. Judd Lemus MD  [facility] HO-2

## 2024-08-14 NOTE — PROGRESS NOTES
Copied from baby's chart:    .. Admit Assessment    Patient Identification  John Carreon   :  2025  Admit Date:  2024  Attending Provider:  Enedelia Roque MD              Referral:    received case management consult for meconium drug screen assessment and family assessment due to late prenatal care.    I contacted with mom, Bhavesh Carreon, via phone in her post-partum room due to COVID isolation precautions. Mom verbally presented appropriate and was cooperative. Mom Expressed appropriate concern for baby's care and explained her Aunt Magdalena Carreon ([phone]) will be providing support for baby while living in [place], [place]. LCSW discussed with mom kinship/ guardianship options and provided resources; mom expressed her appreciation and verbalized understanding.  Mom is not interested in adoption options at this time and has plans to discharge with baby in her custody and understands her rights to make legal decisions for baby during admission. Mom denied having any additional questions or concerns.      Baby boy,  Danny Carreon, was born via  Delivery at 39.0 WGA weighing 7# 3 oz.   OB: [facility] (late prenatal care)    PEDI: undecided at time of assessment    FOB: mom reported Fob is not involved and will not be signing birth certificate    Car Seat: Mom and Aunt reported to having sufficient car seat.    Safe Sleep: Mom and Aunt reported to having safe sleep supplies.    Car seat Safety and Safe Sleep Discussed: Both thoroughly discussed and Resources Provided    WIC/SNAP Benefits: (Resources Provided)    Breast Feed/Formula: Formula feeding    Breast Pump: n/a    Insurance: Medicaid     Other Children: Mom reported she has two daughters born in  and .    DCFS Hx: Denied     Work hx/School:     Social Concerns: None identified at this time      History/Current Symptoms of Anxiety/Depression: Denied;  Discussed PPD and identifying  symptoms and provided mom with PPD counseling resources and symptom brochure.       Identified Support:  Mom reported to having helping at home.     History/Current Substance Use: Denied      Indications of Abuse/Neglect:  Denied         Emotional/Behavioral/Cognitive Issues: None identified at this time. Mom was cognitively insightful, appropriate behavior, and alert.      Current RX Prescriptions: None     Adequate Discharge Transportation: Yes( Magdalena Osorione-Aunt)    Mom's UDS:none collected     Baby's UDS: negative    Report status: No report warranted at this time. Will continue to follow MDS and file report if necessary.    No additional needs or concerns identified; Family is socially cleared. Staff may contact CM at bne [phone] or [phone] with any additional questions or concerns.         Verified her face sheet information:      Living Situation:      Resides at [address] [place] [address] [address]  [facility] [address] [facility] [address], phone: [phone].             Plan:     Patient/caregiver engaged in treatment planning process.      providing psychosocial and supportive counseling, resources, education, assistance and discharge planning as appropriate.  Patient/caregiver state understanding of  available resources,  following, remains available.        Patient/Caregiver informed of right to choose providers or agencies.  Patient/Caregiver provides permission to release any necessary information to [facility] and to Non[facility] agencies as needed to facilitate patient care, treatment planning, and patient discharge planning.  Written and verbal resources provided.

## 2024-08-14 NOTE — ANESTHESIA POSTPROCEDURE EVALUATION
Anesthesia Post Evaluation    Patient: Bhavesh Carreon    Procedure(s) Performed: Procedure(s) (LRB):   SECTION (N/A)    Final Anesthesia Type: spinal      Patient location during evaluation: floor  Patient participation: Yes- Able to Participate  Level of consciousness: awake and oriented  Post-procedure vital signs: reviewed and stable  Pain management: adequate  Airway patency: patent    PONV status at discharge: No PONV  Anesthetic complications: no      Cardiovascular status: hemodynamically stable  Respiratory status: unassisted and spontaneous ventilation  Hydration status: euvolemic  Follow-up not needed.        NEURO: Neuraxial block resolved      Vitals Value Taken Time   /84 24 1029   Temp 36.9 °C (98.5 °F) 24 1029   Pulse 87 24 1029   Resp 16 24 0817   SpO2 98 % 24 1029         Event Time   Out of Recovery 11:30:00         Pain/Carmen Score: Pain Rating Prior to Med Admin: 8 (2024 10:07 AM)  Pain Rating Post Med Admin: 2 (2024  5:36 AM)  Carmen Score: 10 (2024 11:30 AM)

## 2024-08-14 NOTE — PLAN OF CARE
"  Problem: Adult Inpatient Plan of Care  Goal: Plan of Care Review  Outcome: Progressing  Goal: Patient-Specific Goal (Individualized)  Outcome: Progressing  Flowsheets (Taken 2024)  Individualized Care Needs: "pain mangement"  Goal: Absence of Hospital-Acquired Illness or Injury  Outcome: Progressing  Goal: Optimal Comfort and Wellbeing  Outcome: Progressing  Goal: Readiness for Transition of Care  Outcome: Progressing     Problem:  Fall Injury Risk  Goal: Absence of Fall, Infant Drop and Related Injury  Outcome: Progressing     Problem: Infection  Goal: Absence of Infection Signs and Symptoms  Outcome: Progressing     Problem: Wound  Goal: Optimal Coping  Outcome: Progressing  Goal: Optimal Functional Ability  Outcome: Progressing  Goal: Absence of Infection Signs and Symptoms  Outcome: Progressing  Goal: Improved Oral Intake  Outcome: Progressing  Goal: Optimal Pain Control and Function  Outcome: Progressing  Goal: Skin Health and Integrity  Outcome: Progressing  Goal: Optimal Wound Healing  Outcome: Progressing     Problem: Postpartum ( Delivery)  Goal: Successful Parent Role Transition  Outcome: Progressing  Goal: Hemostasis  Outcome: Progressing  Goal: Effective Bowel Elimination  Outcome: Progressing  Goal: Fluid and Electrolyte Balance  Outcome: Progressing  Goal: Absence of Infection Signs and Symptoms  Outcome: Progressing  Goal: Anesthesia/Sedation Recovery  Outcome: Progressing  Goal: Optimal Pain Control and Function  Outcome: Progressing  Goal: Nausea and Vomiting Relief  Outcome: Progressing  Goal: Effective Urinary Elimination  Outcome: Progressing  Goal: Effective Oxygenation and Ventilation  Outcome: Progressing     "

## 2024-08-15 LAB
BASOPHILS # BLD AUTO: 0.02 X10(3)/MCL
BASOPHILS NFR BLD AUTO: 0.3 %
EOSINOPHIL # BLD AUTO: 0.06 X10(3)/MCL (ref 0–0.9)
EOSINOPHIL NFR BLD AUTO: 0.9 %
ERYTHROCYTE [DISTWIDTH] IN BLOOD BY AUTOMATED COUNT: 15.8 % (ref 11.5–17)
HCT VFR BLD AUTO: 22.5 % (ref 37–47)
HGB BLD-MCNC: 7.3 G/DL (ref 12–16)
IMM GRANULOCYTES # BLD AUTO: 0.09 X10(3)/MCL (ref 0–0.04)
IMM GRANULOCYTES NFR BLD AUTO: 1.3 %
LYMPHOCYTES # BLD AUTO: 1.47 X10(3)/MCL (ref 0.6–4.6)
LYMPHOCYTES NFR BLD AUTO: 21.8 %
MCH RBC QN AUTO: 27.4 PG (ref 27–31)
MCHC RBC AUTO-ENTMCNC: 32.4 G/DL (ref 33–36)
MCV RBC AUTO: 84.6 FL (ref 80–94)
MONOCYTES # BLD AUTO: 0.47 X10(3)/MCL (ref 0.1–1.3)
MONOCYTES NFR BLD AUTO: 7 %
NEUTROPHILS # BLD AUTO: 4.64 X10(3)/MCL (ref 2.1–9.2)
NEUTROPHILS NFR BLD AUTO: 68.7 %
NRBC BLD AUTO-RTO: 0 %
PLATELET # BLD AUTO: 315 X10(3)/MCL (ref 130–400)
PLATELETS.RETICULATED NFR BLD AUTO: 2.2 % (ref 0.9–11.2)
PMV BLD AUTO: 10 FL (ref 7.4–10.4)
RBC # BLD AUTO: 2.66 X10(6)/MCL (ref 4.2–5.4)
WBC # BLD AUTO: 6.75 X10(3)/MCL (ref 4.5–11.5)

## 2024-08-15 PROCEDURE — 25000003 PHARM REV CODE 250

## 2024-08-15 PROCEDURE — 85025 COMPLETE CBC W/AUTO DIFF WBC: CPT

## 2024-08-15 PROCEDURE — 27000207 HC ISOLATION

## 2024-08-15 PROCEDURE — 11000001 HC ACUTE MED/SURG PRIVATE ROOM

## 2024-08-15 PROCEDURE — 36415 COLL VENOUS BLD VENIPUNCTURE: CPT

## 2024-08-15 PROCEDURE — 25000003 PHARM REV CODE 250: Performed by: OBSTETRICS & GYNECOLOGY

## 2024-08-15 RX ORDER — LANOLIN ALCOHOL/MO/W.PET/CERES
1 CREAM (GRAM) TOPICAL DAILY
Status: DISCONTINUED | OUTPATIENT
Start: 2024-08-15 | End: 2024-08-16 | Stop reason: HOSPADM

## 2024-08-15 RX ADMIN — FERROUS SULFATE TAB 325 MG (65 MG ELEMENTAL FE) 1 EACH: 325 (65 FE) TAB at 11:08

## 2024-08-15 RX ADMIN — GUAIFENESIN AND DEXTROMETHORPHAN 10 ML: 100; 10 SYRUP ORAL at 05:08

## 2024-08-15 RX ADMIN — OXYCODONE AND ACETAMINOPHEN 1 TABLET: 10; 325 TABLET ORAL at 07:08

## 2024-08-15 RX ADMIN — PRENATAL VITAMINS-IRON FUMARATE 27 MG IRON-FOLIC ACID 0.8 MG TABLET 1 TABLET: at 07:08

## 2024-08-15 RX ADMIN — IBUPROFEN 600 MG: 600 TABLET, FILM COATED ORAL at 11:08

## 2024-08-15 RX ADMIN — OXYCODONE AND ACETAMINOPHEN 1 TABLET: 10; 325 TABLET ORAL at 12:08

## 2024-08-15 RX ADMIN — OXYCODONE AND ACETAMINOPHEN 1 TABLET: 10; 325 TABLET ORAL at 08:08

## 2024-08-15 RX ADMIN — IBUPROFEN 600 MG: 600 TABLET, FILM COATED ORAL at 05:08

## 2024-08-15 RX ADMIN — GUAIFENESIN AND DEXTROMETHORPHAN 10 ML: 100; 10 SYRUP ORAL at 04:08

## 2024-08-15 RX ADMIN — OXYCODONE AND ACETAMINOPHEN 1 TABLET: 10; 325 TABLET ORAL at 04:08

## 2024-08-15 NOTE — PROGRESS NOTES
Post Partum  Delivery Progress Note:     Subjective  Estasia Velma is 23 y.o.   s/p LTCS, POD/PPD #2.   Patient resting comfortably in bed. No complaints overnight. Pain is well controlled this morning and patient no longer hypersensitive to touch. Lochia similar to her menses. Tolerating regular diet without nausea/vomiting. Voiding clear urine  and passing flatus. Ambulating. Infant at bedside. Denies headache, blurry vision, dizziness, chest pain, shortness of breath, RUQ pain, or calf pain.     Objective:  Vitals:    08/15/24 0026 08/15/24 0040 08/15/24 0722 08/15/24 0725   BP:  (!) 143/90 138/88    Pulse:  74 80    Resp: 18 18  18   Temp:  98.3 °F (36.8 °C) 98.7 °F (37.1 °C)    TempSrc:  Oral Oral    SpO2:  99% 100%    Height:           General:  Alert and oriented, in no acute distress   Lungs:  Clear to auscultation bilaterally   Heart::  Regular rate and rhythm   Abdomen  Soft, appropriately tender post-op, nondistended, normoactive bowel sounds    Pelvic:  Scant blood present on pad    Uterine Fundus:   Firm, below the umbilicus   Incision:   bandage clean, dry, intact, incision with steristrips present, c/d/i   Extremities:  No erythema, cords, or tenderness to palpation in bilateral extremities, trace edema     No intake or output data in the 24 hours ending 08/15/24 0934    Recent Labs   Lab 24  0648 24  0524 08/15/24  0516   WBC 5.41 7.20 6.75   HGB 9.6* 5.5* 7.3*    321 315   MCV 83.5 85.7 84.6       Medications   ferrous sulfate  1 tablet Oral Daily    ibuprofen  600 mg Oral Q6H    oxytocin  95 leonardo-units/min Intravenous Once    prenatal vitamin  1 tablet Oral Daily         Current Facility-Administered Medications:     0.9%  NaCl infusion (for blood administration), , Intravenous, Q24H PRN    carboprost, 250 mcg, Intramuscular, Q15 Min PRN    dextromethorphan-guaiFENesin  mg/5 ml, 10 mL, Oral, Q4H PRN    diphenhydrAMINE, 25 mg, Oral, Q4H PRN     diphenoxylate-atropine 2.5-0.025 mg, 2 tablet, Oral, Q6H PRN    lanolin, , Topical (Top), PRN    magnesium hydroxide 400 mg/5 ml, 30 mL, Oral, BID PRN    measles, mumps and rubella vaccine, 0.5 mL, Subcutaneous, vaccine x 1 dose    methylergonovine, 200 mcg, Intramuscular, Once PRN    miSOPROStoL, 800 mcg, Rectal, Once PRN    miSOPROStoL, 800 mcg, Oral, Once PRN    morphine, 4 mg, Intravenous, Q3H PRN    ondansetron, 8 mg, Oral, Q8H PRN    oxyCODONE-acetaminophen, 1 tablet, Oral, Q4H PRN    oxyCODONE-acetaminophen, 1 tablet, Oral, Q4H PRN    oxytocin, 10.0002 Units, Intravenous, Once PRN    oxytocin, 95 leonardo-units/min, Intravenous, Once PRN    oxytocin, 10 Units, Intramuscular, Once PRN    senna-docusate 8.6-50 mg, 1 tablet, Oral, Nightly PRN    simethicone, 1 tablet, Oral, Q6H PRN    sodium chloride 0.9%, 3 mL, Intravenous, Q6H PRN    tranexamic acid (CYKLOKAPRON) infusion, 1,000 mg, Intravenous, Q30 Min PRN    Assessment/Plan  Bhavesh Carreon is a 23 y.o.   s/p rLTCS , POD # 2. Doing well. Pregnancy/Postpartum course complicated by acute drop in hgb to 5.5 requiring 2u pRBC     Postoperative state: Regular Diet. Discontinue IVF. Discontinue Rodney. Encourage ambulation once rodney removed. Continue routine postpartum/postop care.   Asymptomatic anemia:   Antepartum H/H 9.6/30.4 --> [id number]--> postpartum H/H 5.5/18.   S/p 2u pRBCS transfused on , appropriately responded to transfusion w/ H&H 7.3/22.5  No signs/symptoms of anemia this AM, continue to monitor  Initiate Iron supplementation and continue upon discharge for at least 1 month  Intermittently hypertensive with occasional systolic bp in 140, bp mainly ranging 130s/80s, low concern at this time for pre-eclampsia 2/2 clinical picture and physical exam  Continue to monitor bp, if consistently elevated will obtain CMP to evaluate  Feeding: Bottle feeding, continue to encourage  Pain: Scheduled tylenol and ibuprofen, PRN oxycodone for breakthrough  pain  Prophylaxis: SCD, Incentive Spirometry  PPBCM: interested in IUD   Dispo: continue to monitor, anticipate discharge to home on POD#3 if meeting all goals.    Christophe Lemus MD  [facility] HO-2

## 2024-08-16 VITALS
HEIGHT: 63 IN | DIASTOLIC BLOOD PRESSURE: 81 MMHG | RESPIRATION RATE: 18 BRPM | SYSTOLIC BLOOD PRESSURE: 135 MMHG | OXYGEN SATURATION: 99 % | BODY MASS INDEX: 37.94 KG/M2 | HEART RATE: 79 BPM | TEMPERATURE: 99 F

## 2024-08-16 PROCEDURE — 25000003 PHARM REV CODE 250

## 2024-08-16 PROCEDURE — 25000003 PHARM REV CODE 250: Performed by: OBSTETRICS & GYNECOLOGY

## 2024-08-16 RX ORDER — IBUPROFEN 600 MG/1
600 TABLET ORAL EVERY 6 HOURS
Qty: 40 TABLET | Refills: 0 | Status: SHIPPED | OUTPATIENT
Start: 2024-08-16 | End: 2024-08-26

## 2024-08-16 RX ORDER — OXYCODONE AND ACETAMINOPHEN 10; 325 MG/1; MG/1
1 TABLET ORAL EVERY 6 HOURS PRN
Qty: 20 TABLET | Refills: 0 | Status: SHIPPED | OUTPATIENT
Start: 2024-08-16 | End: 2024-08-21

## 2024-08-16 RX ORDER — SIMETHICONE 80 MG
80 TABLET,CHEWABLE ORAL EVERY 6 HOURS PRN
Qty: 20 TABLET | Refills: 0 | Status: SHIPPED | OUTPATIENT
Start: 2024-08-16 | End: 2024-08-21

## 2024-08-16 RX ORDER — AMOXICILLIN 250 MG
1 CAPSULE ORAL NIGHTLY PRN
Qty: 15 TABLET | Refills: 0 | Status: SHIPPED | OUTPATIENT
Start: 2024-08-16 | End: 2024-08-31

## 2024-08-16 RX ORDER — FERROUS SULFATE 325(65) MG
325 TABLET, DELAYED RELEASE (ENTERIC COATED) ORAL EVERY OTHER DAY
Qty: 15 TABLET | Refills: 0 | Status: SHIPPED | OUTPATIENT
Start: 2024-08-16 | End: 2024-09-15

## 2024-08-16 RX ADMIN — OXYCODONE AND ACETAMINOPHEN 1 TABLET: 10; 325 TABLET ORAL at 12:08

## 2024-08-16 RX ADMIN — IBUPROFEN 600 MG: 600 TABLET, FILM COATED ORAL at 06:08

## 2024-08-16 RX ADMIN — FERROUS SULFATE TAB 325 MG (65 MG ELEMENTAL FE) 1 EACH: 325 (65 FE) TAB at 08:08

## 2024-08-16 RX ADMIN — OXYCODONE AND ACETAMINOPHEN 1 TABLET: 10; 325 TABLET ORAL at 04:08

## 2024-08-16 RX ADMIN — IBUPROFEN 600 MG: 600 TABLET, FILM COATED ORAL at 12:08

## 2024-08-16 RX ADMIN — PRENATAL VITAMINS-IRON FUMARATE 27 MG IRON-FOLIC ACID 0.8 MG TABLET 1 TABLET: at 08:08

## 2024-08-16 RX ADMIN — OXYCODONE AND ACETAMINOPHEN 1 TABLET: 10; 325 TABLET ORAL at 08:08

## 2024-08-16 NOTE — DISCHARGE SUMMARY
Delivery Discharge Summary  Obstetrics    Admission date: 2024  Discharge date: 2024    Disposition: To home, self care    Discharge Diagnosis List:      Patient Active Problem List   Diagnosis    History of prior pregnancy with IUGR     BMI affecting pregnancy in third trimester    Late prenatal care affecting pregnancy in third trimester    Echogenic focus of heart of fetus affecting antepartum care of mother    39 weeks gestation of pregnancy    Previous  delivery affecting pregnancy       Procedure: , due to prior c-sections    Hospital Course:  Bhavesh Carreon is a 23 y.o. now , POD #3 who was admitted on 2024 . Patient was subsequently admitted to labor and delivery unit with signed consents.     Labor course was complicated by hx prior  x2, and decision was made to proceed with delivery via scheduled  which was performed without complications.    Please see delivery note for further details. Her postpartum course was complicated by severe drop in H&H from 9.6/30.4 to 5.5/18.0. Patient received 2u pRBC with no obvious source of bleeding. Minimal vaginal discharge and low concern for intraperitoneal bleed. Patient responded appropriately to transfusion and remained stable. On discharge day, patient's pain is controlled with oral pain medications. Pt is tolerating ambulation without SOB or CP, and regular diet without N/V. Reports lochia is mild. Denies any HA, vision changes, F/C, LE swelling. Denies any breast pain/soreness. Of note patient with elevated bp >140/90 inconsistently. Occurred typically once a day and rest of day patient normotensive. Low concern for pre-eclampsia.    Pt in stable condition and ready for discharge. She has been instructed to start and/or continue medications and follow up with her obstetrics provider as listed below.    Pertinent studies:  CBC  Recent Labs   Lab 24  0648 24  0524 08/15/24  0516   WBC 5.41  "7.20 6.75   HGB 9.6* 5.5* 7.3*   HCT 30.4* 18.0* 22.5*   MCV 83.5 85.7 84.6    321 315        Exam:  Fundus firm at umbilicus  Bilateral lower extremity no edema or tenderness      Immunization History   Administered Date(s) Administered    COVID-19, MRNA, LN-S, PF (MODERNA FULL 0.5 ML DOSE) 2021, 2021    DTP 1999, 02/15/2000, 2000    DTaP 10/27/2000, 10/30/2003    HIB 02/15/2000, 2000    Hep B / HiB 1999, 2000    Hepatitis B, Pediatric/Adolescent 02/15/2000, 2000    IPV 2000, 10/30/2003    Influenza (Flumist) - Quadrivalent - Intranasal *Preferred* (2-49 years old) 10/23/2015    Influenza - Quadrivalent - PF *Preferred* (6 months and older) 2020    Influenza - Trivalent (PED) 2000, 2001, 10/18/2004, 2008, 2009, 2010    Influenza - Trivalent - PF (PED) 2012    MMR 10/27/2000, 10/30/2003    Meningococcal Conjugate (MCV4P) 2010, 10/23/2015    OPV 1999, 02/15/2000    Tdap 2010, 2022, 2024    Varicella 10/27/2000, 2007        Delivery:    Episiotomy:     Lacerations:     Repair suture:     Repair # of packets:     Blood loss (ml):       Birth information:  YOB: 2025   Time of birth: 9:29 AM   Sex: male   Delivery type: , Low Transverse   Gestational Age: 39w0d     Measurements    Weight: 3260 g  Weight (lbs): 7 lb 3 oz  Length: 52.1 cm  Length (in): 20.5"  Head circumference: 33.1 cm         Delivery Clinician: Delivery Providers    Delivering clinician: Son Whaley MD   Provider Role    Dominick Johnson RN Registered Nurse             Additional  information:  Forceps:    Vacuum:    Breech:    Observed anomalies      Living?:     Apgars    Living status: Living  Apgar Component Scores:  1 min.:  5 min.:  10 min.:  15 min.:  20 min.:    Skin color:  0  1       Heart rate:  2  2       Reflex irritability:  2  2       Muscle tone:  2  2       Respiratory " effort:  2  2       Total:  8  9       Apgars assigned by: CORINNA CARDONA LPN         Placenta: Delivered:       appearance    Patient Instructions:   Current Discharge Medication List        START taking these medications    Details   ferrous sulfate 325 (65 FE) MG EC tablet Take 1 tablet (325 mg total) by mouth every other day.  Qty: 15 tablet, Refills: 0      ibuprofen (ADVIL,MOTRIN) 600 MG tablet Take 1 tablet (600 mg total) by mouth every 6 (six) hours. for 10 days  Qty: 40 tablet, Refills: 0      oxyCODONE-acetaminophen (PERCOCET)  mg per tablet Take 1 tablet by mouth every 6 (six) hours as needed for Pain.  Qty: 20 tablet, Refills: 0    Comments: Quantity prescribed more than 7 day supply? No      senna-docusate 8.6-50 mg (PERICOLACE) 8.6-50 mg per tablet Take 1 tablet by mouth nightly as needed for Constipation.  Qty: 15 tablet, Refills: 0      simethicone (MYLICON) 80 MG chewable tablet Take 1 tablet (80 mg total) by mouth every 6 (six) hours as needed for Flatulence.  Qty: 20 tablet, Refills: 0           STOP taking these medications       PNV 11-iron fum-folic acid-om3 28 mg iron-1 mg -200 mg Cap Comments:   Reason for Stopping:               Discharge Procedure Orders   Diet Adult Regular     Notify your health care provider if you experience any of the following:  temperature >100.4     Notify your health care provider if you experience any of the following:  persistent nausea and vomiting or diarrhea     Notify your health care provider if you experience any of the following:  severe uncontrolled pain     Notify your health care provider if you experience any of the following:  redness, tenderness, or signs of infection (pain, swelling, redness, odor or green/yellow discharge around incision site)     Notify your health care provider if you experience any of the following:     Notify your health care provider if you experience any of the following:  increased confusion or weakness     Notify your  health care provider if you experience any of the following:  persistent dizziness, light-headedness, or visual disturbances     Notify your health care provider if you experience any of the following:  worsening rash     Notify your health care provider if you experience any of the following:  severe persistent headache     Notify your health care provider if you experience any of the following:  difficulty breathing or increased cough     No dressing needed     Activity as tolerated        Follow-up Information       Son Whaley MD. Go in 2 week(s).    Specialty: Gynecology  Contact information:  [address] [place] [address]  GYNECOLOGY [facility] [address] [phone]               [facility]. Call in 2 week(s).    Specialty: Family Medicine  Why: 2 week visit for incision check and 6 week post partum visit  Contact information:  [address] [facility] [address] [phone]                            Follow-up will be at [facility] clinic.  Follow-up will be in approximately 2 weeks for incision check and 6 weeks for post partum visit.  ER precautions were reviewed with the patient and she was given opportunity to ask questions.  Stable for discharge. Will discharge on 1 month of iron supplementation. Continue to monitor blood pressure at home.    Christophe Lemus MD  [facility] HO-2

## 2024-08-16 NOTE — NURSING
Postpartum c/s and pre-eclampsia Discharge instructions given to pt. Educated pt about signs and symptoms of postpartum pre-eclampsia and to monitor BP at home. Pt verbalized understanding.

## 2024-08-30 ENCOUNTER — OFFICE VISIT (OUTPATIENT)
Dept: FAMILY MEDICINE | Facility: CLINIC | Age: 24
End: 2024-08-30
Payer: MEDICAID

## 2024-08-30 VITALS
WEIGHT: 174.81 LBS | DIASTOLIC BLOOD PRESSURE: 66 MMHG | RESPIRATION RATE: 18 BRPM | BODY MASS INDEX: 30.97 KG/M2 | HEIGHT: 63 IN | HEART RATE: 92 BPM | OXYGEN SATURATION: 100 % | TEMPERATURE: 98 F | SYSTOLIC BLOOD PRESSURE: 102 MMHG

## 2024-08-30 DIAGNOSIS — Z98.891 STATUS POST CESAREAN SECTION: Primary | ICD-10-CM

## 2024-08-30 PROCEDURE — 99213 OFFICE O/P EST LOW 20 MIN: CPT | Mod: PBBFAC

## 2024-08-30 RX ORDER — BUDESONIDE 0.25 MG/2ML
INHALANT ORAL
COMMUNITY
Start: 2024-08-23

## 2024-08-30 RX ORDER — SERTRALINE HYDROCHLORIDE 25 MG/1
25 TABLET, FILM COATED ORAL DAILY
Qty: 30 TABLET | Refills: 2 | Status: SHIPPED | OUTPATIENT
Start: 2024-08-30 | End: 2024-11-28

## 2024-08-30 NOTE — PROGRESS NOTES
"Postpartum OB Clinic    Chief Complaint  Chief Complaint   Patient presents with    Postpartum Care    Depression          History of Present Illness  Bhavesh Carreon is a 23 y.o.  S/P  Delivery Post-Operative here at clinic for 2wk incision check f/u visit.    No issues during the pregnancy. Patient had a H&H drop post partum to 5.5/18.0 an dreceived 2u  pRBC with appropriate response afterwards. Was asymptomatic during this time. Patient is doing well today; has support at home and is currently Bottle feeding.   She report depressed mood with loss of appetitie. Depression will come and go and will randomly cause patient to cry. Parkin score of 15/30 at this time. Denied suicidal/homicidal ideations. She has really great social support at home and knows who she can talk to. States infant is Home, doing well. Denies HA, light-headedness/dizziness, visual changes, CP, le edema, SOB, abdominal pain, n/v, dec appetite. Denies resumption of sexual activity. In touch with the [employer], trying to get approved. Denies any other social needs.    Information for the patient's :  Danny Carreon [[id number]]     Delivery:    Episiotomy:     Lacerations:     Repair suture:     Repair # of packets:     Blood loss (ml):       Birth information:  YOB: 2025   Time of birth: 9:29 AM   Sex: male   Delivery type: , Low Transverse   Gestational Age: 39w0d     Measurements    Weight: 3260 g  Weight (lbs): 7 lb 3 oz  Length: 52.1 cm  Length (in): 20.5"  Head circumference: 33.1 cm         Delivery Clinician: Delivery Providers    Delivering clinician: Son Whaley MD   Provider Role    Dominick Johnson, RN Registered Nurse             Additional  information:  Forceps:    Vacuum:    Breech:    Observed anomalies      Living?:     Apgars    Living status: Living  Apgar Component Scores:  1 min.:  5 min.:  10 min.:  15 min.:  20 min.:    Skin color:  0  1       Heart rate:  2  2     "   Reflex irritability:  2  2       Muscle tone:  2  2       Respiratory effort:  2  2       Total:  8  9       Apgars assigned by: CORINNA CARDONA LPN         Placenta: Delivered         ROS:  - see HPI    Physical Exam:  Temp:  [98.1 °F (36.7 °C)]   Pulse:  [92]   Resp:  [18]   BP: (102)/(66)   SpO2:  [100 %]   General:  VSS, afebrile. No acute distress.   Respiratory: Non labored.  No coughing.  Cardiovascular:  No peripheral edema.  DP pulses palpable bilaterally.   ABD: BS+, Soft, nontender, incision c/d/I healing appropriately, very mild ttp around incision site appropriately so  Musculoskeletal:  Full range of motion of all extremities; no joint deformities or edema.   Neurologic:  A&O X 3.    Psychiatric:  Calm, cooperative.    [place]  Depression Scale:  In the Past 7 Days  I have been able to laugh and see the funny side of things.: Not quite so much now  I have looked forward with enjoyment to things.: Definitely less than I used to  I have blamed myself unnecessarily when things went wrong.: Yes, most of the time  I have been anxious or worried for no good reason.: Yes, sometimes  I have felt scared or panicky for no good reason.: No, not at all  Things have been getting on top of me.: No, most of the time I have coped quite well  I have been so unhappy that I have had difficulty sleeping.: Not very often  I have felt sad or miserable.: Yes, quite often  I have been so unhappy that I have been crying.: Yes, most of the time  The thought of harming myself has occurred to me.: Never  [place]  Depression Scale Total: 15      Medication List with Changes/Refills   New Medications    SERTRALINE (ZOLOFT) 25 MG TABLET    Take 1 tablet (25 mg total) by mouth once daily.   Current Medications    FERROUS SULFATE 325 (65 FE) MG EC TABLET    Take 1 tablet (325 mg total) by mouth every other day.    SENNA-DOCUSATE 8.6-50 MG (PERICOLACE) 8.6-50 MG PER TABLET    Take 1 tablet by mouth nightly as needed  for Constipation.         Assessment / Plan:     Status post , due to hx of prior c/s x2  here for incision check follow-up  - instructed patient on proper wound care, to keep the incision area dry and clean   - Educated pt on signs and sx of post partum blues, post partum depression and post partum psychosis.   - Spring score of 15, patient interested in starting medication for post partum depression at this time, will initiate zoloft 25mg daily  - Denied SI/HI at this time  - discussed medication side effects and importance of going to Emergency room if any SI/HI or thoughts of hurting baby occur  - Plans to bottle feed  - Contraception: Nexplanon    - Patient is not a [facility] patient  - Scheduled for 6 week post partum visit on 10/1/24      Follow up:     RTC: 6 week post partum visit 10/1/24    Christophe Lemus MD   [facility], [facility]  2024

## 2024-09-03 ENCOUNTER — PATIENT MESSAGE (OUTPATIENT)
Dept: FAMILY MEDICINE | Facility: CLINIC | Age: 24
End: 2024-09-03
Payer: MEDICAID

## 2024-10-04 ENCOUNTER — OFFICE VISIT (OUTPATIENT)
Dept: FAMILY MEDICINE | Facility: CLINIC | Age: 24
End: 2024-10-04
Payer: MEDICAID

## 2024-10-04 VITALS
WEIGHT: 172.81 LBS | HEART RATE: 57 BPM | HEIGHT: 62 IN | SYSTOLIC BLOOD PRESSURE: 115 MMHG | OXYGEN SATURATION: 100 % | BODY MASS INDEX: 31.8 KG/M2 | DIASTOLIC BLOOD PRESSURE: 75 MMHG | TEMPERATURE: 98 F

## 2024-10-04 DIAGNOSIS — Z30.9 ENCOUNTER FOR CONTRACEPTIVE MANAGEMENT, UNSPECIFIED TYPE: ICD-10-CM

## 2024-10-04 DIAGNOSIS — D64.9 ANEMIA, UNSPECIFIED TYPE: ICD-10-CM

## 2024-10-04 DIAGNOSIS — Z98.891 STATUS POST CESAREAN SECTION: Primary | ICD-10-CM

## 2024-10-04 LAB
B-HCG UR QL: NEGATIVE
BASOPHILS # BLD AUTO: 0.04 X10(3)/MCL
BASOPHILS NFR BLD AUTO: 0.8 %
CTP QC/QA: YES
EOSINOPHIL # BLD AUTO: 0.11 X10(3)/MCL (ref 0–0.9)
EOSINOPHIL NFR BLD AUTO: 2.1 %
ERYTHROCYTE [DISTWIDTH] IN BLOOD BY AUTOMATED COUNT: 16.1 % (ref 11.5–17)
HCT VFR BLD AUTO: 38.1 % (ref 37–47)
HGB BLD-MCNC: 11.9 G/DL (ref 12–16)
IMM GRANULOCYTES # BLD AUTO: 0.01 X10(3)/MCL (ref 0–0.04)
IMM GRANULOCYTES NFR BLD AUTO: 0.2 %
LYMPHOCYTES # BLD AUTO: 1.95 X10(3)/MCL (ref 0.6–4.6)
LYMPHOCYTES NFR BLD AUTO: 36.7 %
MCH RBC QN AUTO: 25.8 PG (ref 27–31)
MCHC RBC AUTO-ENTMCNC: 31.2 G/DL (ref 33–36)
MCV RBC AUTO: 82.5 FL (ref 80–94)
MONOCYTES # BLD AUTO: 0.25 X10(3)/MCL (ref 0.1–1.3)
MONOCYTES NFR BLD AUTO: 4.7 %
NEUTROPHILS # BLD AUTO: 2.95 X10(3)/MCL (ref 2.1–9.2)
NEUTROPHILS NFR BLD AUTO: 55.5 %
NRBC BLD AUTO-RTO: 0 %
PLATELET # BLD AUTO: 556 X10(3)/MCL (ref 130–400)
PMV BLD AUTO: 10 FL (ref 7.4–10.4)
RBC # BLD AUTO: 4.62 X10(6)/MCL (ref 4.2–5.4)
WBC # BLD AUTO: 5.31 X10(3)/MCL (ref 4.5–11.5)

## 2024-10-04 PROCEDURE — 99213 OFFICE O/P EST LOW 20 MIN: CPT | Mod: PBBFAC

## 2024-10-04 PROCEDURE — 36415 COLL VENOUS BLD VENIPUNCTURE: CPT

## 2024-10-04 PROCEDURE — 85025 COMPLETE CBC W/AUTO DIFF WBC: CPT

## 2024-10-04 RX ORDER — FERROUS SULFATE 325(65) MG
325 TABLET ORAL
Qty: 60 TABLET | Refills: 0 | Status: SHIPPED | OUTPATIENT
Start: 2024-10-04

## 2024-10-04 RX ORDER — SERTRALINE HYDROCHLORIDE 25 MG/1
25 TABLET, FILM COATED ORAL DAILY
Qty: 30 TABLET | Refills: 2 | Status: SHIPPED | OUTPATIENT
Start: 2024-10-04 | End: 2025-01-02

## 2024-10-04 RX ORDER — MEDROXYPROGESTERONE ACETATE 150 MG/ML
150 INJECTION, SUSPENSION INTRAMUSCULAR
Status: COMPLETED | OUTPATIENT
Start: 2024-10-04 | End: 2024-10-04

## 2024-10-04 RX ADMIN — MEDROXYPROGESTERONE ACETATE 150 MG: 150 INJECTION, SUSPENSION INTRAMUSCULAR at 09:10

## 2024-10-04 NOTE — PROGRESS NOTES
Discussed with resident at time of encounter 10-04-24.  Pt. seen.  Second postpartum visit. C/S, anemia (PRBC transfusion 08/2024).  No fatigue.  Zoloft prescribed at previous visit; effective.    PE  Gen: NAD.  HEENT: subtle conjunctival pallor bilaterally (palpebral). No thyromegaly.  Chest: clear.  CV: reg. rhythm; no murmurs.  ABD: incision closed, nontender.    Resident's note reviewed 10-04-24.    Agree with assessment; plan of care appropriate.  UPT negative. Depo-Provera.  Follow-up CBC.  Professional services provided in an outpatient primary care center affiliated with a teaching institution.

## 2024-10-04 NOTE — PROGRESS NOTES
[facility] Office Visit Note    CC:   Postpartum Care       HPI:  Bhavesh Carreon is a 23 y.o.  S/P  Delivery Post-Operative here at clinic for 6wk postpartum f/u visit.    Incision site- Some pain with movement. Denies severe pain, bleeding, drainage. Tylenol provides relief.     Postpartum depression- Initiated on Zoloft 25mg qd at 2wk visit. Doing well. No SI/HI.     Anemia- Postpartum H/H 5.5/18. Received 2uRBC on 24. H/H improved to 7.3/22.5. Discharged with iron supplement and has completed course. Denies fatigue, dizziness, palpitations, SOB, abnormal bleeding.    Contraception- OCP's ineffective in the past. Would like to discuss other contraception options.        Review of Systems:   Review of Systems   Constitutional:  Negative for chills and fever.   Respiratory:  Negative for cough, shortness of breath and wheezing.    Cardiovascular:  Negative for chest pain and palpitations.   Gastrointestinal:  Negative for abdominal pain, diarrhea, nausea and vomiting.   Genitourinary:  Negative for dysuria.   Neurological:  Negative for dizziness, tingling, weakness and headaches.        Current Outpatient Medications   Medication Instructions    budesonide (PULMICORT) 0.25 mg/2 mL nebulizer solution SMARTSI Vial(s) Via Nebulizer Every 12 Hours PRN    ferrous sulfate (IRON (FERROUS SULFATE)) 325 mg, Oral, With breakfast    sertraline (ZOLOFT) 25 mg, Oral, Daily        Physical Exam:   Vitals:    10/04/24 0812   BP: 115/75   Pulse: (!) 57   Temp: 98.1 °F (36.7 °C)      Physical Exam  Vitals reviewed.   Constitutional:       General: She is not in acute distress.  Eyes:      Extraocular Movements: Extraocular movements intact.      Comments: Slight pallor   Cardiovascular:      Rate and Rhythm: Normal rate and regular rhythm.      Heart sounds: Normal heart sounds. No murmur heard.     No gallop.   Pulmonary:      Effort: Pulmonary effort is normal. No respiratory distress.      Breath  sounds: Normal breath sounds.   Abdominal:      General: Bowel sounds are normal.      Palpations: Abdomen is soft.      Tenderness: There is no abdominal tenderness.      Comments: Horizontal incision dry, clean, and intact. Well-healed.   Musculoskeletal:      Right lower leg: No edema.      Left lower leg: No edema.          Assessment/Plan:  1. Status post  section (Primary)  - Incision dry, clean, and intact  - Tylenol prn    2. Post-partum depression  - Zoloft effective, will continue  - sertraline (ZOLOFT) 25 MG tablet; Take 1 tablet (25 mg total) by mouth once daily.  Dispense: 30 tablet; Refill: 2    3. Anemia, unspecified type  - Asymptomatic today  - Recheck H/H today  - Continue iron supplement   - CBC Auto Differential  - ferrous sulfate (IRON, FERROUS SULFATE,) 325 mg (65 mg iron) Tab tablet; Take 1 tablet (325 mg total) by mouth daily with breakfast.  Dispense: 60 tablet; Refill: 0    4. Encounter for contraceptive management, unspecified type  - Patient agreed to Depo injection  - POCT Urine Pregnancy  - medroxyPROGESTERone (DEPO-PROVERA) injection 150 mg       *Patient would like to establish care here at [facility]. Dr. Gutierrez agreeable.      RTC in 11 weeks for Depo        Zarina Junior MD    [facility] Resident, HO-1

## (undated) DEVICE — SEE MEDLINE ITEM 157117

## (undated) DEVICE — SUT MONOCRYL 3-0 KS UND MON

## (undated) DEVICE — SUT CHROMIC 2-0 CT1 36IN BR

## (undated) DEVICE — SOL NACL IRR 1000ML BTL

## (undated) DEVICE — PAD UNDERPAD 30X30

## (undated) DEVICE — BINDER ABDOM 4PANEL 12IN LG/XL

## (undated) DEVICE — ELECTRODE REM PLYHSV RETURN 9

## (undated) DEVICE — CAP BABY BEANIE

## (undated) DEVICE — SOL WATER STRL IRR 1000ML

## (undated) DEVICE — SUT PLAIN MONO 2-0 XLH 27IN

## (undated) DEVICE — SET FLUID WARMER RANGER

## (undated) DEVICE — SUT CTD VICRYL 1 UND BR

## (undated) DEVICE — TRAY CATH FOL SIL URIMTR 16FR

## (undated) DEVICE — BULB SYRINGE EAR IRRIGATION

## (undated) DEVICE — SUT 1 36IN CHROMIC GUT CTX

## (undated) DEVICE — SEE MEDLINE ITEM 156931

## (undated) DEVICE — PAD SANITARY OB STERILE